# Patient Record
Sex: FEMALE | Race: WHITE
[De-identification: names, ages, dates, MRNs, and addresses within clinical notes are randomized per-mention and may not be internally consistent; named-entity substitution may affect disease eponyms.]

---

## 2017-02-21 ENCOUNTER — HOSPITAL ENCOUNTER (EMERGENCY)
Dept: HOSPITAL 62 - ER | Age: 77
LOS: 1 days | Discharge: HOME | End: 2017-02-22
Payer: MEDICARE

## 2017-02-21 DIAGNOSIS — S09.90XA: Primary | ICD-10-CM

## 2017-02-21 DIAGNOSIS — F03.90: ICD-10-CM
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
## 2017-02-21 DIAGNOSIS — R51: ICD-10-CM
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
## 2017-02-21 DIAGNOSIS — I10: ICD-10-CM

## 2017-02-21 PROCEDURE — 70450 CT HEAD/BRAIN W/O DYE: CPT

## 2017-02-21 PROCEDURE — 99284 EMERGENCY DEPT VISIT MOD MDM: CPT

## 2017-02-22 VITALS — SYSTOLIC BLOOD PRESSURE: 146 MMHG | DIASTOLIC BLOOD PRESSURE: 78 MMHG

## 2017-02-22 NOTE — ER DOCUMENT REPORT
ED Fall





- General


Chief Complaint: Fall


Stated Complaint: FALL/ALTERED MENTAL STATUS


Notes: 


The patient is a 76-year-old female, past medical history dementia, on 81 mg 

daily ASA, since from the nursing home after she was found to have her head 

against the table while at dinner.  She denies any LOC and is only complaining 

of a dull frontal headache.  She denies numbness, tingling, blurry vision, neck 

pain, chest pain, shortness of breath, fevers or abdominal pain.


TRAVEL OUTSIDE OF THE [place] IN LAST 30 DAYS: No





- Related data


Allergies/Adverse Reactions: 


 





No Known Allergies Allergy (Unverified 02/21/17 22:45)


 











Past Medical History





- General


Information source: Transfer Record


Cannot obtain history due to: Dementia





- Social History


Smoking Status: Unknown if Ever Smoked


Family History: Reviewed & Not Pertinent





- Past Medical History


Cardiac Medical History: Reports: Hx Hypercholesterolemia, Hx Hypertension


GI Medical History: Reports: Hx Gastroesophageal Reflux Disease


Past Surgical History: Reports: Hx Abdominal Surgery





Review of Systems





- Review of Systems


Notes: 


REVIEW OF SYSTEMS:


CONSTITUTIONAL: -fevers, -chills


EENT: -eye pain, -difficulty swallowing, -nasal congestion


CARDIOVASCULAR:-chest pain, -syncope.


RESPIRATORY: -cough, -SOB


GASTROINTESTINAL: -abdominal pain, -nausea, -vomiting, -diarrhea


GENITOURINARY: -dysuria, -hematuria


MUSCULOSKELETAL: -back pain, -neck pain


SKIN: -rash or skin lesions.


HEMATOLOGIC: -easy bruising or bleeding.


LYMPHATIC: -swollen, enlarged glands.


NEUROLOGICAL: -altered mental status or loss of consciousness, +headache, -

neurologic symptoms


PSYCHIATRIC: -anxiety, -depression.


ALL OTHER SYSTEMS REVIEWED AND NEGATIVE.





Physical Exam





- Vital signs


Vitals: 


 











Temp Pulse Resp BP Pulse Ox


 


 97.5 F   68   16   142/66 H  99 


 


 02/21/17 22:40  02/21/17 22:40  02/21/17 22:40  02/21/17 22:40  02/21/17 22:40














- Notes


Notes: 


PHYSICAL EXAMINATION:





GENERAL: Well-appearing, well-nourished and in no acute distress.





HEAD: Atraumatic, normocephalic.





EYES: Pupils equal round and reactive to light, extraocular movements intact, 

sclera anicteric, conjunctiva are normal.





ENT: nares patent, oropharynx clear without exudates.  Moist mucous membranes.





NECK: Normal range of motion, supple without lymphadenopathy





LUNGS: Breath sounds clear to auscultation bilaterally and equal.  No wheezes 

rales or rhonchi.





HEART: Regular rate and rhythm without murmurs





ABDOMEN: Soft, nontender, normoactive bowel sounds.  No guarding, no rebound.  

No masses appreciated.





EXTREMITIES: Normal range of motion, no pitting or edema.  No cyanosis.





NEUROLOGICAL: Cranial nerves grossly intact.  Normal speech, normal gait.  

Normal sensory, motor, and reflex exams.





PSYCH: Normal mood, normal affect.





SKIN: Warm, Dry, normal turgor, no rashes or lesions noted.





Course





- Re-evaluation


Re-evalutation: 


Patient had a witnessed fall and she said that she hit the front of her head 

against the table.  CT head does not show any acute abnormalities.  Tylenol 

resolved her mild headache.  No syncopal episode and no LOC.  Will DC home back 

to the nursing home with follow-up at primary care physician.  Given strict 

return precautions.





- Vital Signs


Vital signs: 


 











Temp Pulse Resp BP Pulse Ox


 


 97.5 F   68   16   142/66 H  99 


 


 02/21/17 22:40  02/21/17 22:40  02/21/17 22:40  02/21/17 22:40  02/21/17 22:40














- Diagnostic Test


Radiology reviewed: Image reviewed, Reports reviewed


Radiology results interpreted by me: 


CT Head: NAD





Discharge





- Discharge


Clinical Impression: 


Head injury


Qualifiers:


 Encounter type: initial encounter Qualified Code(s): S09.90XA - Unspecified 

injury of head, initial encounter





Condition: Good


Disposition: HOME, SELF-CARE


Additional Instructions: 


A CAT scan of your head does not show any bleeds or skull fractures.





HEADACHE:





     The physician does not feel that the headache you are experiencing has a 

serious underlying cause.  Most headaches are due to emotional stress, with 

resultant muscle tension (tension headache). Occasionally, headaches are 

secondary to changes in the blood vessels of the scalp (vascular headache and 

migraine headache).  Sometimes, a headache is the first symptom of another 

developing illness, such as a viral infection.  You have no evidence of stroke, 

bleeding, meningitis, or other serious cause of your headache.


     The treatment of headaches varies with the severity and cause of the pain.

  Not all headaches need pain shots.  In fact, there is evidence that using 

narcotics for headaches may make them worse in the long run.  The physician 

will determine the therapy that's in your best interest.


     If you develop a fever, if the headache is different from any you've 

previously experienced, or if the headache progressively worsens, then call 

your physician at once or go to the emergency room.








FOLLOW-UP CARE:


If you have been referred to a physician for follow-up care, call the physician

s office for an appointment as you were instructed or within the next two days.

  If you experience worsening or a significant change in your symptoms, notify 

the physician immediately or return to the Emergency Department at any time for 

re-evaluation.

## 2017-04-17 ENCOUNTER — HOSPITAL ENCOUNTER (OUTPATIENT)
Dept: HOSPITAL 62 - RAD | Age: 77
End: 2017-04-17
Attending: SPECIALIST
Payer: MEDICARE

## 2017-04-17 DIAGNOSIS — S32.2XXA: Primary | ICD-10-CM
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
## 2017-04-17 PROCEDURE — 72220 X-RAY EXAM SACRUM TAILBONE: CPT

## 2017-07-30 ENCOUNTER — HOSPITAL ENCOUNTER (EMERGENCY)
Dept: HOSPITAL 62 - ER | Age: 77
Discharge: NURSING, REHAB OR LONG-TERM CARE | End: 2017-07-30
Payer: MEDICARE

## 2017-07-30 VITALS — SYSTOLIC BLOOD PRESSURE: 152 MMHG | DIASTOLIC BLOOD PRESSURE: 85 MMHG

## 2017-07-30 DIAGNOSIS — G30.9: ICD-10-CM

## 2017-07-30 DIAGNOSIS — R10.9: ICD-10-CM
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
## 2017-07-30 DIAGNOSIS — M79.652: ICD-10-CM

## 2017-07-30 DIAGNOSIS — T14.8: Primary | ICD-10-CM

## 2017-07-30 DIAGNOSIS — R51: ICD-10-CM

## 2017-07-30 DIAGNOSIS — M79.651: ICD-10-CM

## 2017-07-30 DIAGNOSIS — M54.2: ICD-10-CM

## 2017-07-30 DIAGNOSIS — Z79.899: ICD-10-CM

## 2017-07-30 DIAGNOSIS — E78.00: ICD-10-CM

## 2017-07-30 DIAGNOSIS — I10: ICD-10-CM

## 2017-07-30 DIAGNOSIS — F02.80: ICD-10-CM
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
## 2017-07-30 DIAGNOSIS — M54.9: ICD-10-CM

## 2017-07-30 PROCEDURE — 72110 X-RAY EXAM L-2 SPINE 4/>VWS: CPT

## 2017-07-30 PROCEDURE — 99285 EMERGENCY DEPT VISIT HI MDM: CPT

## 2017-07-30 PROCEDURE — 73552 X-RAY EXAM OF FEMUR 2/>: CPT

## 2017-07-30 PROCEDURE — 72125 CT NECK SPINE W/O DYE: CPT

## 2017-07-30 PROCEDURE — 70450 CT HEAD/BRAIN W/O DYE: CPT

## 2017-07-30 NOTE — RADIOLOGY REPORT (SQ)
EXAM DESCRIPTION:  FEMUR BILATERAL 2 VIEWS



COMPLETED DATE/TIME:  7/30/2017 4:56 am



REASON FOR STUDY:  fall pain head neck bilateral femur and abdomen



COMPARISON:  None.



NUMBER OF VIEWS:  8 views.



TECHNIQUE:  8radiographic images acquired of the right and left femur to include hip and knee in at l
east one projection.



LIMITATIONS:  None.



FINDINGS:  MINERALIZATION: Normal.

BONES: No acute fracture.  No worrisome bone lesions.

SOFT TISSUES: No obvious swelling or foreign body.

OTHER: Sutures of the lower pelvis.



IMPRESSION:  NEGATIVE STUDY OF THE RIGHT AND LEFT FEMURS. NO RADIOGRAPHIC EVIDENCE FOR ACUTE INJURY.



TECHNICAL DOCUMENTATION:  JOB ID:  [id number]

 2011 [employer]- All Rights Reserved

## 2017-07-30 NOTE — RADIOLOGY REPORT (SQ)
EXAM DESCRIPTION:  CT CERVICAL SPINE WITHOUT



COMPLETED DATE/TIME:  7/30/2017 4:34 am



REASON FOR STUDY:  fall pain head neck bilateral femur and abdomen



COMPARISON:  None.



TECHNIQUE:  Axial images acquired through the cervical spine without intravenous contrast.  Images re
viewed with lung, soft tissue and bone windows.  Reconstructed coronal and sagittal MPR images review
ed.  Images stored on PACS.

All CT scanners at this facility use dose modulation, iterative reconstruction, and/or weight based d
osing when appropriate to reduce radiation dose to as low as reasonably achievable (ALARA).

[facility]: Dose Right  [facility]: CareDose    [facility]: Dose Right    [facility]: Teradose 4D    [facility]: Smart Technologies



RADIATION DOSE:  Up-to-date CT equipment and radiation dose reduction techniques were employed. CTDIv
ol: 7.6 mGy. DLP: 162 mGy-cm. mGy.



LIMITATIONS:  None.



FINDINGS:  ALIGNMENT: Anatomic.

MINERALIZATION: Normal.

VERTEBRAL BODIES: No fractures or dislocation.

DISCS: Small to moderate desiccated -protrusive disc disease between the C2 and C6 levels.  Mild-to-m
oderate bilateral see the 5 and C6 bony foraminal stenosis

FACETS, LATERAL MASSES, POSTERIOR ELEMENTS: Moderate spondylosis.

HARDWARE: None in the spine.

VISUALIZED RIBS: No fractures.

LUNG APICES AND SOFT TISSUES: Moderate bullous disease of the visualized upper lungs.

OTHER: No other significant finding.



IMPRESSION:  No acute findings.  In small to moderate desiccated-protrusive disc disease of the midce
rvical spine.  Moderate spondylosis.



TECHNICAL DOCUMENTATION:  JOB ID:  [id number]

Quality ID # 436: Final reports with documentation of one or more dose reduction techniques (e.g., Au
tomated exposure control, adjustment of the mA and/or kV according to patient size, use of iterative 
reconstruction technique)

 2011 [employer]- All Rights Reserved

## 2017-07-30 NOTE — RADIOLOGY REPORT (SQ)
EXAM DESCRIPTION:  CT HEAD WITHOUT



COMPLETED DATE/TIME:  7/30/2017 4:34 am



REASON FOR STUDY:  fall pain head neck bilateral femur and abdomen



COMPARISON:  2.22.

17



TECHNIQUE:  Axial images acquired through the brain without intravenous contrast.  Images reviewed wi
th bone, brain and subdural windows.  Images stored on PACS.

All CT scanners at this facility use dose modulation, iterative reconstruction, and/or weight based d
osing when appropriate to reduce radiation dose to as low as reasonably achievable (ALARA).

[facility]: Dose Right  [facility]: CareDose    [facility]: Dose Right    [facility]: Teradose 4D    [facility]: [employer]



RADIATION DOSE:  Up-to-date CT equipment and radiation dose reduction techniques were employed. CTDIv
ol: 50.5 mGy. DLP: 909 mGy-cm. mGy.



LIMITATIONS:  None.



FINDINGS:  VENTRICLES: Normal size and contour.

CEREBRUM: No masses.  No hemorrhage.  No midline shift.  Normal gray/white matter differentiation.  N
o evidence for acute infarction.  Mild-to-moderate cerebral volume loss.

CEREBELLUM: No masses.  No hemorrhage.  No alteration of density.  No evidence for acute infarction.

EXTRAAXIAL SPACES: No fluid collections.  No masses.

ORBITS AND GLOBE: No intra- or extraconal masses.  Normal contour of globe without masses.

CALVARIUM: No fracture.

PARANASAL SINUSES: No fluid or mucosal thickening.

SOFT TISSUES: No mass or hematoma.

OTHER: No other significant finding.



IMPRESSION:  No acute findings.



TECHNICAL DOCUMENTATION:  JOB ID:  [id number]

Quality ID # 436: Final reports with documentation of one or more dose reduction techniques (e.g., Au
tomated exposure control, adjustment of the mA and/or kV according to patient size, use of iterative 
reconstruction technique)

 2011 [email or web address]- All Rights Reserved

## 2017-07-30 NOTE — RADIOLOGY REPORT (SQ)
EXAM DESCRIPTION:  L SPINE WHOLE



COMPLETED DATE/TIME:  7/30/2017 4:56 am



REASON FOR STUDY:  fall pain head neck bilateral femur and abdomen



COMPARISON:  None.



NUMBER OF VIEWS:  Five views including obliques.



TECHNIQUE:  AP, lateral, oblique, and sacral radiographic images acquired of the lumbar spine.



LIMITATIONS:  None.



FINDINGS:  MINERALIZATION: Osteopenia.

SEGMENTATION: Normal.  No transitional anatomy.

ALIGNMENT: Normal.

VERTEBRAE: Mild L1 anterior compression deformity likely chronic compared with frontal abdomen radiog
raphs, 12/20/2016.

DISCS: Preserved height.  No significant osteophytes or end plate irregularity.

POSTERIOR ELEMENTS: Pedicles and facets are intact.  No pars defect or posterior arch defects.

HARDWARE: None in the spine.

PARASPINAL SOFT TISSUES: Normal.

PELVIS: Intact as visualized. No fractures or worrisome bone lesions. SI joints intact.

OTHER: No other significant finding.



IMPRESSION:  Mild L1 anterior compression deformity, likely chronic based on limited comparative exam
s.



TECHNICAL DOCUMENTATION:  JOB ID:  [id number]

 2011 [employer]- All Rights Reserved
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
## 2017-11-13 ENCOUNTER — HOSPITAL ENCOUNTER (INPATIENT)
Dept: HOSPITAL 62 - ER | Age: 77
LOS: 5 days | Discharge: NURSING, REHAB OR LONG-TERM CARE | DRG: 439 | End: 2017-11-18
Attending: INTERNAL MEDICINE | Admitting: INTERNAL MEDICINE
Payer: MEDICARE

## 2017-11-13 DIAGNOSIS — N17.9: ICD-10-CM

## 2017-11-13 DIAGNOSIS — E86.0: ICD-10-CM

## 2017-11-13 DIAGNOSIS — E78.5: ICD-10-CM

## 2017-11-13 DIAGNOSIS — N30.00: ICD-10-CM

## 2017-11-13 DIAGNOSIS — G30.9: ICD-10-CM

## 2017-11-13 DIAGNOSIS — I12.9: ICD-10-CM

## 2017-11-13 DIAGNOSIS — Z79.899: ICD-10-CM

## 2017-11-13 DIAGNOSIS — F02.81: ICD-10-CM

## 2017-11-13 DIAGNOSIS — K59.00: ICD-10-CM

## 2017-11-13 DIAGNOSIS — I71.4: ICD-10-CM

## 2017-11-13 DIAGNOSIS — K02.9: ICD-10-CM

## 2017-11-13 DIAGNOSIS — E87.0: ICD-10-CM

## 2017-11-13 DIAGNOSIS — N18.2: ICD-10-CM

## 2017-11-13 DIAGNOSIS — K85.10: Primary | ICD-10-CM

## 2017-11-13 DIAGNOSIS — Z66: ICD-10-CM

## 2017-11-13 DIAGNOSIS — I10: ICD-10-CM

## 2017-11-13 DIAGNOSIS — K85.90: ICD-10-CM

## 2017-11-13 DIAGNOSIS — M19.90: ICD-10-CM

## 2017-11-13 DIAGNOSIS — K21.9: ICD-10-CM

## 2017-11-13 DIAGNOSIS — E44.0: ICD-10-CM

## 2017-11-13 LAB
ALBUMIN SERPL-MCNC: 4.1 G/DL (ref 3.5–5)
ALP SERPL-CCNC: 138 U/L (ref 38–126)
ALT SERPL-CCNC: 11 U/L (ref 9–52)
ANION GAP SERPL CALC-SCNC: 11 MMOL/L (ref 5–19)
APPEARANCE UR: (no result)
AST SERPL-CCNC: 24 U/L (ref 14–36)
BASOPHILS # BLD AUTO: 0.1 10^3/UL (ref 0–0.2)
BASOPHILS NFR BLD AUTO: 1 % (ref 0–2)
BILIRUB DIRECT SERPL-MCNC: 0.5 MG/DL (ref 0–0.4)
BILIRUB SERPL-MCNC: 0.7 MG/DL (ref 0.2–1.3)
BILIRUB UR QL STRIP: NEGATIVE
BUN SERPL-MCNC: 25 MG/DL (ref 7–20)
CALCIUM: 9.2 MG/DL (ref 8.4–10.2)
CHLORIDE SERPL-SCNC: 107 MMOL/L (ref 98–107)
CO2 SERPL-SCNC: 27 MMOL/L (ref 22–30)
CREAT SERPL-MCNC: 1.36 MG/DL (ref 0.52–1.25)
EOSINOPHIL # BLD AUTO: 0.3 10^3/UL (ref 0–0.6)
EOSINOPHIL NFR BLD AUTO: 4 % (ref 0–6)
ERYTHROCYTE [DISTWIDTH] IN BLOOD BY AUTOMATED COUNT: 13.9 % (ref 11.5–14)
GLUCOSE SERPL-MCNC: 94 MG/DL (ref 75–110)
GLUCOSE UR STRIP-MCNC: NEGATIVE MG/DL
HCT VFR BLD CALC: 36.5 % (ref 36–47)
HGB BLD-MCNC: 12.3 G/DL (ref 12–15.5)
HGB HCT DIFFERENCE: 0.4
KETONES UR STRIP-MCNC: NEGATIVE MG/DL
LIPASE SERPL-CCNC: 447.9 U/L (ref 23–300)
LYMPHOCYTES # BLD AUTO: 1.7 10^3/UL (ref 0.5–4.7)
LYMPHOCYTES NFR BLD AUTO: 21.9 % (ref 13–45)
MCH RBC QN AUTO: 31.2 PG (ref 27–33.4)
MCHC RBC AUTO-ENTMCNC: 33.7 G/DL (ref 32–36)
MCV RBC AUTO: 93 FL (ref 80–97)
MONOCYTES # BLD AUTO: 0.7 10^3/UL (ref 0.1–1.4)
MONOCYTES NFR BLD AUTO: 9.6 % (ref 3–13)
NEUTROPHILS # BLD AUTO: 4.9 10^3/UL (ref 1.7–8.2)
NEUTS SEG NFR BLD AUTO: 63.5 % (ref 42–78)
NITRITE UR QL STRIP: NEGATIVE
PH UR STRIP: 5 [PH] (ref 5–9)
POTASSIUM SERPL-SCNC: 4.5 MMOL/L (ref 3.6–5)
PROT SERPL-MCNC: 8.2 G/DL (ref 6.3–8.2)
PROT UR STRIP-MCNC: NEGATIVE MG/DL
RBC # BLD AUTO: 3.94 10^6/UL (ref 3.72–5.28)
SODIUM SERPL-SCNC: 145.2 MMOL/L (ref 137–145)
SP GR UR STRIP: 1.01
UROBILINOGEN UR-MCNC: NEGATIVE MG/DL (ref ?–2)
WBC # BLD AUTO: 7.8 10^3/UL (ref 4–10.5)

## 2017-11-13 PROCEDURE — 99285 EMERGENCY DEPT VISIT HI MDM: CPT

## 2017-11-13 PROCEDURE — 84100 ASSAY OF PHOSPHORUS: CPT

## 2017-11-13 PROCEDURE — 96361 HYDRATE IV INFUSION ADD-ON: CPT

## 2017-11-13 PROCEDURE — 87086 URINE CULTURE/COLONY COUNT: CPT

## 2017-11-13 PROCEDURE — 87040 BLOOD CULTURE FOR BACTERIA: CPT

## 2017-11-13 PROCEDURE — 96372 THER/PROPH/DIAG INJ SC/IM: CPT

## 2017-11-13 PROCEDURE — 36415 COLL VENOUS BLD VENIPUNCTURE: CPT

## 2017-11-13 PROCEDURE — 83690 ASSAY OF LIPASE: CPT

## 2017-11-13 PROCEDURE — 80048 BASIC METABOLIC PNL TOTAL CA: CPT

## 2017-11-13 PROCEDURE — 74000: CPT

## 2017-11-13 PROCEDURE — 85027 COMPLETE CBC AUTOMATED: CPT

## 2017-11-13 PROCEDURE — 85025 COMPLETE CBC W/AUTO DIFF WBC: CPT

## 2017-11-13 PROCEDURE — 83735 ASSAY OF MAGNESIUM: CPT

## 2017-11-13 PROCEDURE — G0378 HOSPITAL OBSERVATION PER HR: HCPCS

## 2017-11-13 PROCEDURE — 80061 LIPID PANEL: CPT

## 2017-11-13 PROCEDURE — 96365 THER/PROPH/DIAG IV INF INIT: CPT

## 2017-11-13 PROCEDURE — 80053 COMPREHEN METABOLIC PANEL: CPT

## 2017-11-13 PROCEDURE — S0183 PROCHLORPERAZINE 5 MG: HCPCS

## 2017-11-13 PROCEDURE — 81001 URINALYSIS AUTO W/SCOPE: CPT

## 2017-11-13 PROCEDURE — 74177 CT ABD & PELVIS W/CONTRAST: CPT

## 2017-11-13 RX ADMIN — Medication SCH ML: at 22:34

## 2017-11-13 RX ADMIN — ATORVASTATIN CALCIUM SCH MG: 10 TABLET, FILM COATED ORAL at 22:34

## 2017-11-13 RX ADMIN — SODIUM CHLORIDE PRN ML: 9 INJECTION, SOLUTION INTRAVENOUS at 15:20

## 2017-11-13 RX ADMIN — SODIUM CHLORIDE PRN ML: 9 INJECTION, SOLUTION INTRAVENOUS at 16:10

## 2017-11-13 RX ADMIN — DORIPENEM SCH MG: 500 POWDER, FOR SOLUTION INTRAVENOUS at 22:31

## 2017-11-13 RX ADMIN — QUETIAPINE FUMARATE SCH MG: 25 TABLET, FILM COATED ORAL at 22:34

## 2017-11-13 NOTE — ER DOCUMENT REPORT
ED General





- General


Chief Complaint: Abdominal Pain


Stated Complaint: ABDOMINAL PAIN


Time Seen by Provider: 11/13/17 12:32


Mode of Arrival: Medic


Information source: Patient


Cannot obtain history due to: Dementia


Notes: 





77 yr old female hx of dementia presents from the [facility] with concern of abd pain 

and nausea vomtiing prior to arrival. Upon arrival pt denies any concerns at 

all and requests to go home immediately without any work up.  No care giver is 

present. 


TRAVEL OUTSIDE OF THE [place] IN LAST 30 DAYS: No





- HPI


Onset: Just prior to arrival


Onset/Duration: Sudden


Quality of pain: Achy


Severity: Mild


Pain Level: 1


Associated symptoms: Nausea, Vomiting


Exacerbated by: Denies


Relieved by: Denies


Similar symptoms previously: No


Recently seen / treated by doctor: No





- Related Data


Allergies/Adverse Reactions: 


 





No Known Allergies Allergy (Unverified 02/21/17 22:45)


 








Home Medications: 


 Current Home Medications





Atorvastatin Calcium [Lipitor 10 mg Tablet] 10 mg PO QHS 11/13/17 [History]


Metoprolol Succinate [Toprol Xl 25 mg Tab.sr] 12.5 mg PO DAILY 11/13/17 [History

]


Omeprazole 40 mg PO Q6AM 11/13/17 [History]


Quetiapine Fumarate [Seroquel] 50 mg PO QHS 11/13/17 [History]











Past Medical History





- Social History


Smoking Status: Never Smoker


Cigarette use (# per day): No


Chew tobacco use (# tins/day): No


Smoking Education Provided: No


Family History: Reviewed & Not Pertinent





- Past Medical History


Cardiac Medical History: Reports: Hx Hypercholesterolemia, Hx Hypertension


Renal/ Medical History: Denies: Hx Peritoneal Dialysis


GI Medical History: Reports: Hx Gastroesophageal Reflux Disease


Musculoskeltal Medical History: Reports Hx Arthritis


Psychiatric Medical History: Reports: Hx Dementia - Alzheimer's


Past Surgical History: Reports: Hx Abdominal Surgery





Review of Systems





- Review of Systems


Notes: 





REVIEW OF SYSTEMS:


CONSTITUTIONAL :  Denies fever,  chills, or sweats.  Denies recent illness.


EENT:   Denies eye, ear, throat, or mouth pain or symptoms.  Denies nasal or 

sinus congestion or discharge.  Denies throat, tongue, or mouth swelling or 

difficulty swallowing.


CARDIOVASCULAR:  Denies chest pain.  Denies palpitations or racing or irregular 

heart beat.  Denies ankle edema.


RESPIRATORY:  Denies cough, cold, or chest congestion.  Denies shortness of 

breath, difficulty breathing, or wheezing.


GASTROINTESTINAL: Admits to abdominal pain nausea vomiting


GENITOURINARY:  Denies difficulty urinating, painful urination, burning, 

frequency, blood in urine, or discharge.


FEMALE  GENITOURINARY:  Denies vaginal bleeding, heavy or abnormal periods, 

irregular periods.  Denies vaginal discharge or odor. 


MUSCULOSKELETAL:  Denies back or neck pain or stiffness.  Denies joint pain or 

swelling.


SKIN:   Denies rash, lesions or sores.


HEMATOLOGIC :   Denies easy bruising or bleeding.


LYMPHATIC:  Denies swollen, enlarged glands.


NEUROLOGICAL:  Denies confusion or altered mental status.  Denies passing out 

or loss of consciousness.  Denies dizziness or lightheadedness.  Denies 

headache.  Denies weakness or paralysis or loss of use of either side.  Denies 

problems with gait or speech.  Denies sensory loss, numbness, or tingling.  

Denies seizures.


PSYCHIATRIC:  Denies anxiety or stress.  Denies depression, suicidal ideation, 

or homicidal ideation.





ALL OTHER SYSTEMS REVIEWED AND NEGATIVE.











PHYSICAL EXAMINATION:





GENERAL: Well-appearing, well-nourished and in no acute distress.





HEAD: Atraumatic, normocephalic.





EYES: Pupils equal round and reactive to light, extraocular movements intact, 

conjunctiva are normal.





ENT: Nares patent, oropharynx clear without exudates.  Moist mucous membranes.





NECK: Normal range of motion, supple without lymphadenopathy





LUNGS: Breath sounds clear to auscultation bilaterally and equal.  No wheezes 

rales or rhonchi.





HEART: Regular rate and rhythm without murmurs





ABDOMEN: Soft, minimally tender epigastric region no rebound or guarding





Female : deferred





Musculoskeletal: Normal range of motion, no pitting or edema.  No cyanosis.





NEUROLOGICAL: Cranial nerves grossly intact.  Normal speech, normal gait.  

Normal sensory, motor exams





PSYCH: Normal mood, normal affect.





SKIN: Warm, Dry, normal turgor, no rashes or lesions noted.

















Dictation was performed using Dragon voice recognition software





Physical Exam





- Vital signs


Vitals: 


 











Resp Pulse Ox


 


 17   92 


 


 11/13/17 11:46  11/13/17 11:46














Course





- Re-evaluation


Re-evalutation: 





11/13/17 13:05


When I evaluated the patient, she was in no distress denies any complaints at 

all but was quite demented, she is oriented to person place not time.  She 

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  She was agreeable at 

that time to do a CT of the abdomen even though she is nontender in no 

distress.  Patient was evaluated by the nurse right after me immediately 

refused to have IV placed stated that she does not have any imaging done and 

that we cannot force her.  I did attempt to call daughter and left a voice 

message


11/13/17 15:53


Daughter called again after finding of aortic aneurysm , 


[facility] was paged as well in order to get further history since patient has 

dementia 


She was 


11/13/17 16:04


I did speak regarding admission with Dr. Downs, I explained that the patient 

denies any abdominal pain at this time, lipase was elevated patient does have 

urinary tract infection, 6 cm aortic aneurysm is noted but patient is a DNR, he 

believes patient's appropriate for discharge however at this time I will 

continue to watch in the ED


11/13/17 16:27


Ms Simmons called for the 4th time 


11/13/17 16:38


Dr Malone vascular surgeon states he would not intervene, requests beta blocker 

for pressure control increased to 25 mg daily 


11/13/17 16:39


I spoke with Dr Downs and Dr Munroe, since patient is a DNR, no surgical 

intervention is offered by [facility], I would like ot admit her here for the 

pancreatitis and UTI. Pt is now admitting to mild epigastric pain which does 

not appear to be related to the AAA, i would have preferred to speak with 

family but again no response 








- Vital Signs


Vital signs: 


 











Temp Pulse Resp BP Pulse Ox


 


       29 H  138/80 H  100 


 


       11/13/17 13:01  11/13/17 16:02  11/13/17 16:02














- Laboratory


Result Diagrams: 


 11/13/17 14:43





 11/13/17 12:34


Laboratory results interpreted by me: 


 











  11/13/17 11/13/17





  12:34 12:34


 


Sodium  145.2 H 


 


BUN  25 H 


 


Creatinine  1.36 H 


 


Est GFR ( Amer)  46 L 


 


Est GFR (Non-Af Amer)  38 L 


 


Direct Bilirubin  0.5 H 


 


Alkaline Phosphatase  138 H 


 


Lipase  447.9 H 


 


Urine Blood   SMALL H


 


Ur Leukocyte Esterase   LARGE H














- Diagnostic Test


Radiology reviewed: Image reviewed, Reports reviewed





Discharge





- Discharge


Clinical Impression: 


 DNR (do not resuscitate), AAA (abdominal aortic aneurysm) without rupture





UTI (urinary tract infection)


Qualifiers:


 Urinary tract infection type: acute cystitis Hematuria presence: without 

hematuria Qualified Code(s): N30.00 - Acute cystitis without hematuria





Pancreatitis


Qualifiers:


 Chronicity: acute Pancreatitis type: unspecified pancreatitis type Acute 

pancreatitis complication: unspecified Qualified Code(s): K85.90 - Acute 

pancreatitis without necrosis or infection, unspecified





Dementia


Qualifiers:


 Dementia type: unspecified type Dementia behavioral disturbance: with 

behavioral disturbance Qualified Code(s): F03.91 - Unspecified dementia with 

behavioral disturbance





Condition: Stable


Disposition: ADMITTED AS OBSERVATION


Admitting Provider: Hospitalist


Unit Admitted: Telemetry

## 2017-11-13 NOTE — PROGRESS NOTE
Provider Note


Provider Note: 


The RN admitting the patient reports that the patient has confirmation of DNR 

status.  Her paperwork has been DNR since December 2, 2016.  Therefore, I will 

change the patient's CODE STATUS to DO NOT RESUSCITATE.

## 2017-11-13 NOTE — RADIOLOGY REPORT (SQ)
EXAM DESCRIPTION:  CT ABD/PELVIS WITH IV ONLY



COMPLETED DATE/TIME:  11/13/2017 5:03 pm



REASON FOR STUDY:  abd pain



COMPARISON:  None.



TECHNIQUE:  CT scan of the abdomen and pelvis performed using helical scanning technique with dynamic
 intravenous contrast injection.  No oral contrast. Images reviewed with lung, soft tissue, and bone 
windows. Reconstructed coronal and sagittal MPR images reviewed. Delayed images for evaluation of the
 urinary system also acquired. All images stored on PACS.

All CT scanners at this facility use dose modulation, iterative reconstruction, and/or weight based d
osing when appropriate to reduce radiation dose to as low as reasonably achievable (ALARA).

[facility]: Dose Right  [facility]: CareDose    [facility]: Dose Right    [facility]: Teradose 4D    [facility]: Smart Technologies



CONTRAST TYPE AND DOSE:  contrast/concentration: Isovue 370.00 mg/ml; Total Contrast Delivered: 53.0 
ml; Total Saline Delivered: 39.4 ml



RENAL FUNCTION:  BUN 25 creatinine 1.4



RADIATION DOSE:  Up-to-date CT equipment and radiation dose reduction techniques were employed. CTDIv
ol: 5.4 - 6.5 mGy. DLP: 625 mGy-cm..



LIMITATIONS:  Positioning.



FINDINGS:  LOWER CHEST: Hiatal hernia.

LIVER: Normal size. No masses.  No dilated ducts.

SPLEEN: Normal size. No focal lesions.

PANCREAS: No masses. No significant calcifications. No adjacent inflammation or peripancreatic fluid 
collections. Pancreatic duct not dilated.

GALLBLADDER: Gallstones. No inflammatory changes to suggest cholecystitis.

ADRENAL GLANDS: No significant masses or asymmetry.

RIGHT KIDNEY AND URETER: No solid masses.   No significant calcifications.   No hydronephrosis or hyd
roureter.

LEFT KIDNEY AND URETER: No solid masses.   No significant calcifications.   No hydronephrosis or hydr
oureter.

AORTA AND VESSELS: Infrarenal aortic aneurysm 5.5 x 5.8 x 5.7 cm in AP by transverse by craniocaudal 
diameter.  The true lumen diameter 3.0 x 4.4 cm.  Mural thrombus to right of midline but no definite 
active extravasation.

RETROPERITONEUM: No retroperitoneal adenopathy, hemorrhage or masses.

BOWEL AND PERITONEAL CAVITY: Abundant fecal material throughout nondilated colon.

APPENDIX: Not visualized.

PELVIS: Mild mass effect on the urinary bladder due to impacted fecal material in the rectum.

ABDOMINAL WALL: Prior ventral hernia repair.

BONES: No significant or acute findings.

OTHER: No other significant finding.



IMPRESSION:

1. 6 cm infrarenal aortic aneurysm.

2. Fecal retention.

3. Cholelithiasis.



TECHNICAL DOCUMENTATION:  JOB ID:  [id number]

Quality ID # [id number]: Final reports with documentation of one or more dose reduction techniques (e.g., Au
tomated exposure control, adjustment of the mA and/or kV according to patient size, use of iterative 
reconstruction technique)

 2011 [employer]- All Rights Reserved

## 2017-11-13 NOTE — PDOC H&P
History of Present Illness


Admission Date/PCP: 


  11/13/17 16:56





  NO LOCALMD





Patient complains of: Abdominal pain.


History of Present Illness: 


YOKO ACEVEDO is a 77 year old female with underlying dementia.  She normally 

resides at the [facility].  She was brought in today for a 3 day history of abdominal 

pain.  I am unable to get any reliable history from this patient secondary to 

her severe dementia.  However, intermittently she has complained of nausea and 

vomiting with a poor appetite.  She denies fevers or diarrhea.  Again, her 

review of systems is very unreliable.  The patient's physical exam is 

compatible with acute pancreatitis.  The patient did have a CT scan done that 

confirms gallstones but does not demonstrate evidence of acute cholecystitis.  

The patient also has evidence of a urinary tract infection and according to 

records at [facility] she has had an extended spectrum beta lactamase 

producing bacteria in the past.  She will be admitted to observation and 

reevaluated in the morning.  She will be made n.p.o. overnight and we can try 

to advance her diet tomorrow.








Past Medical History


Cardiac Medical History: Reports: Hyperlipidema, Hypertension


GI Medical History: Reports: Gastroesophageal Reflux Disease


Musculoskeltal Medical History: Reports: Arthritis


Psychiatric Medical History: Reports: Dementia - Alzheimer's





Social History


Smoking Status: Never Smoker





- Advance Directive


Resuscitation Status: Full Code


Surrogate healthcare decision maker:: 


The computer lists Carissa Simmons as the patient's point of contact.  Her phone 

number in the chart is [phone]








Family History


Family History: Reviewed & Not Pertinent


Parental Family History Reviewed: No - Unknown


Children Family History Reviewed: Unknown


Sibling(s) Family History Reviewed.: Unknown





Medication/Allergy


Home Medications: 








Atorvastatin Calcium [Lipitor 10 mg Tablet] 10 mg PO QHS 11/13/17 


Metoprolol Succinate [Toprol Xl 25 mg Tab.sr] 12.5 mg PO DAILY 11/13/17 


Omeprazole 40 mg PO Q6AM 11/13/17 


Quetiapine Fumarate [Seroquel] 50 mg PO QHS 11/13/17 








Allergies/Adverse Reactions: 


 





No Known Allergies Allergy (Unverified 02/21/17 22:45)


 











Review of Systems


ROS unobtainable: Due to mental status





Physical Exam


Vital Signs: 


 











Temp Pulse Resp BP Pulse Ox


 


       29 H  138/80 H  100 


 


       11/13/17 13:01  11/13/17 16:02  11/13/17 16:02











General appearance: PRESENT: no acute distress


Additional comments: 


The patient is in no distress.  She is oriented to person.  She does not know 

that she is in the emergency room.  She cannot tell me what city she is in.  

She cannot tell me her current address.  She is able to follow simple commands, 

E.  G.  Take a deep breath.  Her facial appearance is fairly normal.  Cranial 

nerves II through XII are intact.  Her oropharynx shows that she has upper 

teeth but no lower teeth.  She does have moderate dental caries present.  The 

lips are normal.  The mucous membranes are moist.  The neck is supple.  She has 

full range of motion.  Trachea is midline.  Thyroid is nonpalpable.  She does 

not have any cervical or supraclavicular lymphadenopathy.  The lungs are clear 

anteriorly.  Posteriorly, the patient has some scattered crackles at the bases 

of the lung fields.  Her cardiac exam is regular without murmurs, gallops or 

rubs.  The abdomen is soft and flat.  Bowel sounds are present and are 

hypoactive.  The patient does not have any hepatosplenomegaly.  She has no 

guarding or rebound noted and there are no hernias or masses present.  The 

patient does have epigastric pain with moderate to deep palpation.  The lower 

extremities are warm to touch.  She has trace pedal edema which is symmetrical 

bilaterally.  Skin exam is clean dry and intact without lesions or rashes.








Results


Impressions: 


 





Abdomen/Pelvis CT  11/13/17 12:32


IMPRESSION:


1. 6 cm infrarenal aortic aneurysm.


2. Fecal retention.


3. Cholelithiasis.


 














Assessment & Plan





- Diagnosis


(1) AAA (abdominal aortic aneurysm) without rupture


Is this a current diagnosis for this admission?: Yes   


Plan: 


The emergency room physician has contacted the vascular surgeon at [facility].  They have not recommended any intervention at this 

time for the abdominal aortic aneurysm.  However, recommendations include 

increasing the dose of beta-blocker.








(2) Dementia


Qualifiers: 


   Dementia type: unspecified type   Dementia behavioral disturbance: with 

behavioral disturbance   Qualified Code(s): F03.91 - Unspecified dementia with 

behavioral disturbance   


Is this a current diagnosis for this admission?: Yes   


Plan: 


At this point in time I need to corroborate old records for CODE STATUS.  We 

are trying to reach Carissa Simmons.  Until I can verify this information I will 

make the patient a full code.








(3) Pancreatitis


Qualifiers: 


   Chronicity: acute   Pancreatitis type: unspecified pancreatitis type   Acute 

pancreatitis complication: unspecified   Qualified Code(s): K85.90 - Acute 

pancreatitis without necrosis or infection, unspecified   


Plan: 


Begin IV fluids.  I will make the patient n.p.o. overnight.  Hopefully, we can 

try to advance her diet tomorrow.  The patient does have gallstones.  If she is 

considered an appropriate candidate we could consider an elective 

cholecystectomy.








(4) UTI (urinary tract infection)


Qualifiers: 


   Urinary tract infection type: acute cystitis   Hematuria presence: without 

hematuria   Qualified Code(s): N30.00 - Acute cystitis without hematuria   


Is this a current diagnosis for this admission?: Yes   


Plan: 


Antibiotics will be started for acute urinary tract infection.








- Time


Time Spent: 50 to 70 Minutes





- Inpatient Certification


Medical Necessity: Significant Comorbidiites Make Outpatient Treatment Too Risky

, Need for Pain Control, Need for IV Antibiotics, Risk of Complication if Not 

Cared For in Hospital

## 2017-11-14 LAB
ANION GAP SERPL CALC-SCNC: 11 MMOL/L (ref 5–19)
BUN SERPL-MCNC: 17 MG/DL (ref 7–20)
CALCIUM: 8.8 MG/DL (ref 8.4–10.2)
CHLORIDE SERPL-SCNC: 109 MMOL/L (ref 98–107)
CHOLEST SERPL-MCNC: 200.84 MG/DL (ref 0–200)
CO2 SERPL-SCNC: 24 MMOL/L (ref 22–30)
CREAT SERPL-MCNC: 1.15 MG/DL (ref 0.52–1.25)
DIRECT HDL: 38 MG/DL (ref 40–?)
ERYTHROCYTE [DISTWIDTH] IN BLOOD BY AUTOMATED COUNT: 14.3 % (ref 11.5–14)
GLUCOSE SERPL-MCNC: 97 MG/DL (ref 75–110)
HCT VFR BLD CALC: 34.5 % (ref 36–47)
HGB BLD-MCNC: 11.7 G/DL (ref 12–15.5)
HGB HCT DIFFERENCE: 0.6
LDLC SERPL DIRECT ASSAY-MCNC: 130 MG/DL (ref ?–100)
MAGNESIUM SERPL-MCNC: 2 MG/DL (ref 1.6–2.3)
MCH RBC QN AUTO: 31.3 PG (ref 27–33.4)
MCHC RBC AUTO-ENTMCNC: 34.1 G/DL (ref 32–36)
MCV RBC AUTO: 92 FL (ref 80–97)
PHOSPHATE SERPL-MCNC: 3.3 MG/DL (ref 2.5–4.5)
POTASSIUM SERPL-SCNC: 4 MMOL/L (ref 3.6–5)
RBC # BLD AUTO: 3.75 10^6/UL (ref 3.72–5.28)
SODIUM SERPL-SCNC: 144.2 MMOL/L (ref 137–145)
TRIGL SERPL-MCNC: 114 MG/DL (ref ?–150)
VLDLC SERPL CALC-MCNC: 23 MG/DL (ref 10–31)
WBC # BLD AUTO: 6.2 10^3/UL (ref 4–10.5)

## 2017-11-14 RX ADMIN — LANSOPRAZOLE SCH MG: 30 TABLET, ORALLY DISINTEGRATING, DELAYED RELEASE ORAL at 05:38

## 2017-11-14 RX ADMIN — ATORVASTATIN CALCIUM SCH MG: 10 TABLET, FILM COATED ORAL at 21:20

## 2017-11-14 RX ADMIN — QUETIAPINE FUMARATE SCH MG: 25 TABLET, FILM COATED ORAL at 21:20

## 2017-11-14 RX ADMIN — OXYCODONE AND ACETAMINOPHEN PRN TAB: 5; 325 TABLET ORAL at 09:23

## 2017-11-14 RX ADMIN — DEXTROSE AND SODIUM CHLORIDE PRN ML: 5; .45 INJECTION, SOLUTION INTRAVENOUS at 14:21

## 2017-11-14 RX ADMIN — METOPROLOL SUCCINATE SCH MG: 25 TABLET, EXTENDED RELEASE ORAL at 09:22

## 2017-11-14 RX ADMIN — DORIPENEM SCH MG: 500 POWDER, FOR SOLUTION INTRAVENOUS at 05:48

## 2017-11-14 RX ADMIN — Medication SCH ML: at 05:39

## 2017-11-14 RX ADMIN — Medication SCH ML: at 21:21

## 2017-11-14 RX ADMIN — OXYCODONE AND ACETAMINOPHEN PRN TAB: 5; 325 TABLET ORAL at 15:40

## 2017-11-14 RX ADMIN — DOCUSATE SODIUM SCH MG: 100 CAPSULE, LIQUID FILLED ORAL at 09:22

## 2017-11-14 RX ADMIN — Medication SCH ML: at 14:10

## 2017-11-14 NOTE — PHYSICIAN ADVISORY NOTE
Physician Advisor ProgressNote


.: 


Pursuant to the  plan for [facility], I have reviewed the medical record 

for this patient.  





Physician Advisor Statement: 





Please consider documenting, if you agree:


1.  "Hypernatremia, likely due to intravascular volume depletion, now resolved"


2.  If she shows evidence of increased work of breathing associated with 

hypoxemia, please document this & consider dx of "Acute Resp Failure"








Status:  Appropriately made Obs initially.


Since coming in, pt has had episodes of hypoxemia with tachypnea, as well as 

agitation.  She was started on Doripenem (after Rocephin in ED).  


If she cannot be advanced in diet and safely set up for discharge today, please 

document the reasons ("I am concerned about ....") and may then change to 

Inpatient status.








Thanks!


CK

## 2017-11-14 NOTE — PDOC PROGRESS REPORT
Subjective


Progress Note for:: 11/14/17


Subjective:: 





YOKO ACEVEDO is a 77 year old female with underlying dementia who was admitted 

on 11/13 with nausea, vomiting, and poor PO appetite. Admission labs notable 

for elevated lipase however CT abdomen showed no evidence of intra-abdominal 

pathology other than choleithiasis. She was admitted for suspected pancreatitis 

as observation status.





Overnight continues to complain of abdominal pain, nausea, and vomiting. States 

that she had 3 episodes of non-bloody emesis. Has been unable to tolerate any 

PO due to symptoms.  Denies fevers, chills, CP, SOB, abdominal pain. Patient at 

times confused and is an unreliable historian. Denies previous episodes of 

pancreatitis. 





Physical Exam


Vital Signs: 


 











Temp Pulse Resp BP Pulse Ox


 


 97.8 F   76   18   143/90 H  94 


 


 11/14/17 11:23  11/14/17 11:23  11/14/17 11:23  11/14/17 11:23  11/14/17 11:23








 Intake & Output











 11/13/17 11/14/17 11/15/17





 06:59 06:59 06:59


 


Weight  51.6 kg 











General appearance: PRESENT: no acute distress, well-developed, well-nourished


Head exam: PRESENT: atraumatic, normocephalic


Eye exam: PRESENT: conjunctiva pink.  ABSENT: scleral icterus


Mouth exam: PRESENT: dry mucosa


Neck exam: ABSENT: carotid bruit, JVD, lymphadenopathy, thyromegaly


Respiratory exam: PRESENT: crackles - mild crackles at bases, decreased breath 

sounds.  ABSENT: rales, rhonchi, wheezes


Cardiovascular exam: PRESENT: RRR.  ABSENT: diastolic murmur, rubs, systolic 

murmur


Pulses: PRESENT: normal dorsalis pedis pul


Vascular exam: PRESENT: normal capillary refill


GI/Abdominal exam: PRESENT: normal bowel sounds, soft, tenderness - Epigastric 

tenderness to deep palpation.  ABSENT: distended, guarding, mass, organolmegaly

, rebound


Rectal exam: PRESENT: deferred


Extremities exam: PRESENT: full ROM.  ABSENT: calf tenderness, clubbing, pedal 

edema


Neurological exam: PRESENT: alert, awake, oriented to person, oriented to 

situation, CN II-XII grossly intact.  ABSENT: oriented to place, oriented to 

time, motor sensory deficit


Psychiatric exam: PRESENT: anxious.  ABSENT: homicidal ideation, suicidal 

ideation


Focused psych exam: PRESENT: other - Confused


Skin exam: PRESENT: dry, intact, warm.  ABSENT: cyanosis, rash





Results


Laboratory Results: 


 





 11/14/17 04:25 





 11/14/17 04:25 





 











  11/14/17 11/14/17





  04:25 04:25


 


WBC  6.2 


 


RBC  3.75 


 


Hgb  11.7 L 


 


Hct  34.5 L 


 


MCV  92 


 


MCH  31.3 


 


MCHC  34.1 


 


RDW  14.3 H 


 


Plt Count  205 


 


Sodium   144.2


 


Potassium   4.0


 


Chloride   109 H


 


Carbon Dioxide   24


 


Anion Gap   11


 


BUN   17


 


Creatinine   1.15


 


Est GFR ( Amer)   56 L


 


Est GFR (Non-Af Amer)   46 L


 


Glucose   97


 


Calcium   8.8


 


Phosphorus   3.3


 


Magnesium   2.0


 


Triglycerides   114


 


Cholesterol   200.84 H


 


LDL Cholesterol Direct   130 H


 


VLDL Cholesterol   23.0


 


HDL Cholesterol   38 L











Impressions: 


 





Abdomen/Pelvis CT  11/13/17 12:32


IMPRESSION:


1. 6 cm infrarenal aortic aneurysm.


2. Fecal retention.


3. Cholelithiasis.


 














Assessment & Plan





- Diagnosis


(1) Pancreatitis


Qualifiers: 


   Chronicity: acute   Pancreatitis type: unspecified pancreatitis type   Acute 

pancreatitis complication: unspecified   Qualified Code(s): K85.90 - Acute 

pancreatitis without necrosis or infection, unspecified   


Plan: 


Not entirely clear if abdominal pain is secondary to pancreatitis. Lipase is 

noted to be elevated however there is no evidence of pancreatic stranding on CT 

abdominal report. However patient continues to have abdominal pain, nausea, 

vomiting, and poor PO intake. 





Plan


- Continue NPO status for now, consideration of advancing diet later today or 

tomorrow AM


- Continue supportive measures with IVF, anti-emetics, and pain medications


- No indication for further imaging at this time.  








(2) UTI (urinary tract infection)


Qualifiers: 


   Urinary tract infection type: acute cystitis   Hematuria presence: without 

hematuria   Qualified Code(s): N30.00 - Acute cystitis without hematuria   


Is this a current diagnosis for this admission?: Yes   


Plan: 


Resolved. Urine culture with mixed marisol. Antibiotics discontinued. 








(3) AAA (abdominal aortic aneurysm) without rupture


Is this a current diagnosis for this admission?: Yes   


Plan: 


At admission, [facility] ED contacted the vascular surgeon at [facility].  They have not recommended any intervention at this time for 

the abdominal aortic aneurysm.  Beta-blocker dose was increased per 

recommendation. Should be followed closely as an outpatient. 








(4) DNR (do not resuscitate)


Plan: 


Code status discussed and is DNI/DNR








(5) Dementia


Qualifiers: 


   Dementia type: unspecified type   Dementia behavioral disturbance: with 

behavioral disturbance   Qualified Code(s): F03.91 - Unspecified dementia with 

behavioral disturbance   


Is this a current diagnosis for this admission?: Yes   


Plan: 


Appears to be at baseline. 








- Time


Time Spent with patient: 15-24 minutes


Critical Time spent with patient: 15-24 minutes





- Inpatient Certification


Medical Necessity: Need For IV Fluids, Risk of Complication if Not Cared For in 

Hospital

## 2017-11-15 LAB
ANION GAP SERPL CALC-SCNC: 13 MMOL/L (ref 5–19)
BUN SERPL-MCNC: 14 MG/DL (ref 7–20)
CALCIUM: 8.7 MG/DL (ref 8.4–10.2)
CHLORIDE SERPL-SCNC: 108 MMOL/L (ref 98–107)
CO2 SERPL-SCNC: 22 MMOL/L (ref 22–30)
CREAT SERPL-MCNC: 1.08 MG/DL (ref 0.52–1.25)
ERYTHROCYTE [DISTWIDTH] IN BLOOD BY AUTOMATED COUNT: 14.1 % (ref 11.5–14)
GLUCOSE SERPL-MCNC: 112 MG/DL (ref 75–110)
HCT VFR BLD CALC: 32.1 % (ref 36–47)
HGB BLD-MCNC: 11.1 G/DL (ref 12–15.5)
HGB HCT DIFFERENCE: 1.2
MCH RBC QN AUTO: 31.7 PG (ref 27–33.4)
MCHC RBC AUTO-ENTMCNC: 34.6 G/DL (ref 32–36)
MCV RBC AUTO: 92 FL (ref 80–97)
POTASSIUM SERPL-SCNC: 3.7 MMOL/L (ref 3.6–5)
RBC # BLD AUTO: 3.5 10^6/UL (ref 3.72–5.28)
SODIUM SERPL-SCNC: 143 MMOL/L (ref 137–145)
WBC # BLD AUTO: 6 10^3/UL (ref 4–10.5)

## 2017-11-15 RX ADMIN — Medication SCH ML: at 21:25

## 2017-11-15 RX ADMIN — LANSOPRAZOLE SCH MG: 30 TABLET, ORALLY DISINTEGRATING, DELAYED RELEASE ORAL at 05:02

## 2017-11-15 RX ADMIN — DOCUSATE SODIUM SCH MG: 100 CAPSULE, LIQUID FILLED ORAL at 09:26

## 2017-11-15 RX ADMIN — DEXAMETHASONE SODIUM PHOSPHATE PRN MG: 10 INJECTION INTRAMUSCULAR; INTRAVENOUS at 09:35

## 2017-11-15 RX ADMIN — METOPROLOL SUCCINATE SCH MG: 25 TABLET, EXTENDED RELEASE ORAL at 09:29

## 2017-11-15 RX ADMIN — Medication SCH ML: at 13:00

## 2017-11-15 RX ADMIN — OXYCODONE AND ACETAMINOPHEN PRN TAB: 5; 325 TABLET ORAL at 10:20

## 2017-11-15 RX ADMIN — DEXAMETHASONE SODIUM PHOSPHATE PRN MG: 10 INJECTION INTRAMUSCULAR; INTRAVENOUS at 16:19

## 2017-11-15 RX ADMIN — QUETIAPINE FUMARATE SCH MG: 25 TABLET, FILM COATED ORAL at 21:28

## 2017-11-15 RX ADMIN — Medication SCH ML: at 05:02

## 2017-11-15 RX ADMIN — ATORVASTATIN CALCIUM SCH MG: 10 TABLET, FILM COATED ORAL at 21:28

## 2017-11-15 RX ADMIN — PROCHLORPERAZINE MALEATE PRN MG: 5 TABLET, FILM COATED ORAL at 10:20

## 2017-11-15 NOTE — PDOC PROGRESS REPORT
Subjective


Progress Note for:: 11/15/17


Subjective:: 





Pt states that she is still having abd pain. 





Physical Exam


Vital Signs: 


 











Temp Pulse Resp BP Pulse Ox


 


 98.9 F   74   16   106/62   90 L


 


 11/15/17 11:35  11/15/17 11:35  11/15/17 11:35  11/15/17 11:35  11/15/17 11:35








 Intake & Output











 11/14/17 11/15/17 11/16/17





 06:59 06:59 06:59


 


Intake Total  1781 


 


Balance  1781 


 


Weight  51.4 kg 51.4 kg











General appearance: PRESENT: no acute distress, thin


Head exam: PRESENT: atraumatic, normocephalic


Eye exam: PRESENT: conjunctiva pink, EOMI, PERRLA.  ABSENT: scleral icterus


Ear exam: PRESENT: normal external ear exam


Mouth exam: PRESENT: moist, tongue midline


Neck exam: ABSENT: carotid bruit, JVD, lymphadenopathy, thyromegaly


Respiratory exam: PRESENT: clear to auscultation [place].  ABSENT: rales, rhonchi, 

wheezes


Cardiovascular exam: PRESENT: RRR.  ABSENT: diastolic murmur, rubs, systolic 

murmur


Pulses: PRESENT: normal dorsalis pedis pul


GI/Abdominal exam: PRESENT: soft, tenderness - epigastric tenderness


Rectal exam: PRESENT: deferred


Extremities exam: PRESENT: full ROM.  ABSENT: calf tenderness, clubbing, pedal 

edema


Neurological exam: PRESENT: alert, altered, oriented to person


Psychiatric exam: PRESENT: appropriate affect, normal mood.  ABSENT: homicidal 

ideation, suicidal ideation


Skin exam: PRESENT: dry, intact, warm.  ABSENT: cyanosis, rash





Results


Laboratory Results: 


 





 11/15/17 04:30 





 11/15/17 04:30 





 











  11/15/17 11/15/17





  04:30 04:30


 


WBC  6.0 


 


RBC  3.50 L 


 


Hgb  11.1 L 


 


Hct  32.1 L 


 


MCV  92 


 


MCH  31.7 


 


MCHC  34.6 


 


RDW  14.1 H 


 


Plt Count  198 


 


Sodium   143.0


 


Potassium   3.7


 


Chloride   108 H


 


Carbon Dioxide   22


 


Anion Gap   13


 


BUN   14


 


Creatinine   1.08


 


Est GFR ( Amer)   > 60


 


Est GFR (Non-Af Amer)   49 L


 


Glucose   112 H


 


Calcium   8.7











Impressions: 


 





Abdomen/Pelvis CT  11/13/17 12:32


IMPRESSION:


1. 6 cm infrarenal aortic aneurysm.


2. Fecal retention.


3. Cholelithiasis.


 














Assessment & Plan





- Diagnosis


(1) Pancreatitis


Qualifiers: 


   Chronicity: acute   Pancreatitis type: unspecified pancreatitis type   Acute 

pancreatitis complication: unspecified   Qualified Code(s): K85.90 - Acute 

pancreatitis without necrosis or infection, unspecified   


Is this a current diagnosis for this admission?: Yes   


Plan: 


Will continue current treatment. 








(2) Constipation


Is this a current diagnosis for this admission?: Yes   


Plan: 


Write for Enema.  Will write for Magnesium Citrate. 








(3) Acute renal injury


Is this a current diagnosis for this admission?: Yes   


Plan: 


in Setting of CKD Stage 2:  Will continue IVF.  Renal function has improved. 








(4) AAA (abdominal aortic aneurysm) without rupture


Is this a current diagnosis for this admission?: Yes   


Plan: 


Supportive care. 








(5) Dementia


Qualifiers: 


   Dementia type: unspecified type   Dementia behavioral disturbance: with 

behavioral disturbance   Qualified Code(s): F03.91 - Unspecified dementia with 

behavioral disturbance   


Is this a current diagnosis for this admission?: Yes   


Plan: 


Supportive Care. 








(6) UTI (urinary tract infection)


Qualifiers: 


   Urinary tract infection type: acute cystitis   Hematuria presence: without 

hematuria   Qualified Code(s): N30.00 - Acute cystitis without hematuria   


Is this a current diagnosis for this admission?: Yes   


Plan: 


Urine culture not consistent with UTI/Acute Cystitis:  Ruled out. 








(7) DNR (do not resuscitate)


Is this a current diagnosis for this admission?: Yes   


Plan: 


Continue current treatment. 








- Time


Time Spent with patient: 15-24 minutes

## 2017-11-16 LAB
ALBUMIN SERPL-MCNC: 3.1 G/DL (ref 3.5–5)
ALP SERPL-CCNC: 95 U/L (ref 38–126)
ALT SERPL-CCNC: 27 U/L (ref 9–52)
ANION GAP SERPL CALC-SCNC: 10 MMOL/L (ref 5–19)
AST SERPL-CCNC: 17 U/L (ref 14–36)
BILIRUB DIRECT SERPL-MCNC: 0.5 MG/DL (ref 0–0.4)
BILIRUB SERPL-MCNC: 0.6 MG/DL (ref 0.2–1.3)
BUN SERPL-MCNC: 12 MG/DL (ref 7–20)
CALCIUM: 8.7 MG/DL (ref 8.4–10.2)
CHLORIDE SERPL-SCNC: 110 MMOL/L (ref 98–107)
CO2 SERPL-SCNC: 23 MMOL/L (ref 22–30)
CREAT SERPL-MCNC: 1.13 MG/DL (ref 0.52–1.25)
GLUCOSE SERPL-MCNC: 108 MG/DL (ref 75–110)
POTASSIUM SERPL-SCNC: 3.9 MMOL/L (ref 3.6–5)
PROT SERPL-MCNC: 6.3 G/DL (ref 6.3–8.2)
SODIUM SERPL-SCNC: 143.4 MMOL/L (ref 137–145)

## 2017-11-16 RX ADMIN — Medication SCH ML: at 13:04

## 2017-11-16 RX ADMIN — METOPROLOL SUCCINATE SCH MG: 25 TABLET, EXTENDED RELEASE ORAL at 11:44

## 2017-11-16 RX ADMIN — QUETIAPINE FUMARATE SCH MG: 25 TABLET, FILM COATED ORAL at 21:21

## 2017-11-16 RX ADMIN — DEXAMETHASONE SODIUM PHOSPHATE PRN MG: 10 INJECTION INTRAMUSCULAR; INTRAVENOUS at 16:48

## 2017-11-16 RX ADMIN — DOCUSATE SODIUM SCH MG: 100 CAPSULE, LIQUID FILLED ORAL at 11:42

## 2017-11-16 RX ADMIN — Medication SCH ML: at 21:21

## 2017-11-16 RX ADMIN — Medication SCH ML: at 05:09

## 2017-11-16 RX ADMIN — ATORVASTATIN CALCIUM SCH MG: 10 TABLET, FILM COATED ORAL at 21:21

## 2017-11-16 RX ADMIN — LANSOPRAZOLE SCH MG: 30 TABLET, ORALLY DISINTEGRATING, DELAYED RELEASE ORAL at 05:09

## 2017-11-16 RX ADMIN — DEXTROSE AND SODIUM CHLORIDE PRN ML: 5; .45 INJECTION, SOLUTION INTRAVENOUS at 12:19

## 2017-11-16 NOTE — PHYSICIAN ADVISORY NOTE
Physician Advisor ProgressNote


.: 


Pursuant to the  plan for [facility], I have reviewed the medical record 

for this patient.  





Physician Advisor Statement: 


 





Please consider documenting, if you agree:


1.  "Acute hypernatremia, likely due to intravascular volume depletion, resolved

"
2.  "Suspected VIKAS, ruled out"  (or document evidence of >0.3mg/dl 

improvement in Cr since IVF)


3.  "underweight with protein-calorie malnutrition [state mild, mod, or severe] 

with BMI  __, ____[?wt loss, ?appetite loss, ]" 


 [if possible, give specifics on intake, wt loss, loss of SQ fat & muscle mass, 

diminished hand  strength,    & clinical importance such as (A) 

nutritional assessment ordered, (B) modified diet or supplements ordered,  (C) 

additional labs ordered, (D) prolonged wound healing time, (E) delayed infxn 

clearance]  





Thanks!


CK

## 2017-11-16 NOTE — PDOC PROGRESS REPORT
Subjective


Progress Note for:: 11/16/17


Subjective:: 





Nursing states that pt had a large bowel movement overnight.  Pt this morning 

states that she is not having any abd pain.  Pt states that she would like to 

eat. 





Physical Exam


Vital Signs: 


 











Temp Pulse Resp BP Pulse Ox


 


 98.8 F   80   16   127/76 H  93 


 


 11/16/17 07:42  11/16/17 07:42  11/16/17 07:42  11/16/17 07:42  11/16/17 07:42








 Intake & Output











 11/15/17 11/16/17 11/17/17





 06:59 06:59 06:59


 


Intake Total 1781 1800 


 


Balance 1781 1800 


 


Weight 51.4 kg 50.2 kg 











General appearance: PRESENT: no acute distress, thin


Head exam: PRESENT: atraumatic, normocephalic


Eye exam: PRESENT: conjunctiva pink, EOMI, PERRLA.  ABSENT: scleral icterus


Ear exam: PRESENT: normal external ear exam


Neck exam: ABSENT: carotid bruit, JVD, lymphadenopathy, thyromegaly


Respiratory exam: PRESENT: clear to auscultation [place].  ABSENT: rales, rhonchi, 

wheezes


Cardiovascular exam: PRESENT: RRR.  ABSENT: diastolic murmur, rubs, systolic 

murmur


Pulses: PRESENT: normal dorsalis pedis pul


Vascular exam: PRESENT: normal capillary refill


GI/Abdominal exam: PRESENT: normal bowel sounds, soft.  ABSENT: distended, 

guarding, mass, organolmegaly, rebound, tenderness


Rectal exam: PRESENT: deferred


Extremities exam: PRESENT: full ROM.  ABSENT: calf tenderness, clubbing, pedal 

edema


Musculoskeletal exam: PRESENT: full ROM


Neurological exam: PRESENT: alert, awake, oriented to person


Psychiatric exam: PRESENT: appropriate affect, normal mood.  ABSENT: homicidal 

ideation, suicidal ideation


Skin exam: PRESENT: dry, intact, warm.  ABSENT: cyanosis, rash





Results


Laboratory Results: 


 





 11/15/17 04:30 





 11/16/17 05:06 





 











  11/16/17





  05:06


 


Sodium  143.4


 


Potassium  3.9


 


Chloride  110 H


 


Carbon Dioxide  23


 


Anion Gap  10


 


BUN  12


 


Creatinine  1.13


 


Est GFR ( Amer)  57 L


 


Est GFR (Non-Af Amer)  47 L


 


Glucose  108


 


Calcium  8.7


 


Total Bilirubin  0.6


 


AST  17


 


ALT  27


 


Alkaline Phosphatase  95


 


Total Protein  6.3


 


Albumin  3.1 L











Impressions: 


 





Abdomen/Pelvis CT  11/13/17 12:32


IMPRESSION:


1. 6 cm infrarenal aortic aneurysm.


2. Fecal retention.


3. Cholelithiasis.


 














Assessment & Plan





- Diagnosis


(1) Pancreatitis


Qualifiers: 


   Chronicity: acute   Pancreatitis type: unspecified pancreatitis type   Acute 

pancreatitis complication: unspecified   Qualified Code(s): K85.90 - Acute 

pancreatitis without necrosis or infection, unspecified   


Is this a current diagnosis for this admission?: Yes   


Plan: 


Will advance diet today.  








(2) Constipation


Is this a current diagnosis for this admission?: Yes   


Plan: 


Will check KUB today.  Nursing and Pt reporting large bowel movement.  








(3) Acute renal injury


Is this a current diagnosis for this admission?: Yes   


Plan: 


in Setting of CKD Stage 2: Resolved.  








(4) AAA (abdominal aortic aneurysm) without rupture


Is this a current diagnosis for this admission?: Yes   


Plan: 


Supportive care. 








(5) Dementia


Qualifiers: 


   Dementia type: unspecified type   Dementia behavioral disturbance: with 

behavioral disturbance   Qualified Code(s): F03.91 - Unspecified dementia with 

behavioral disturbance   


Is this a current diagnosis for this admission?: Yes   


Plan: 


Supportive Care. 








(6) UTI (urinary tract infection)


Qualifiers: 


   Urinary tract infection type: acute cystitis   Hematuria presence: without 

hematuria   Qualified Code(s): N30.00 - Acute cystitis without hematuria   


Is this a current diagnosis for this admission?: Yes   


Plan: 


Urine culture not consistent with UTI/Acute Cystitis:  Ruled out. 








(7) DNR (do not resuscitate)


Is this a current diagnosis for this admission?: Yes   


Plan: 


Continue current treatment. 








- Time


Time Spent with patient: 15-24 minutes

## 2017-11-16 NOTE — RADIOLOGY REPORT (SQ)
EXAM DESCRIPTION:  KUB/ABDOMEN (SINGLE VIEW)



COMPLETED DATE/TIME:  11/16/2017 10:43 am



REASON FOR STUDY:  Constipation A41.9  SEPSIS, UNSPECIFIED ORGANISM K85.80  OTHER ACUTE PANCREATITIS 
WITHOUT NECROSIS OR INFECTIO



COMPARISON:  12/20/2016 KUB

CT ABDOMEN AND PELVIS 11/13/2017



NUMBER OF VIEWS:  One view.



TECHNIQUE:   Supine radiographic image of the abdomen acquired.



LIMITATIONS:  None.



FINDINGS:  BOWEL GAS PATTERN: Persistent large amount of stool in the ascending and transverse colon,
 large amount of stool in the rectum.  Otherwise unremarkable bowel gas pattern.

CALCIFICATIONS: Multiple small calcified gallstones in the right upper quadrant.

SOFT TISSUES: No gross mass or suggestion of organomegaly.

HARDWARE: Midline surgical sutures, faintly radiopaque.

BONES: No acute fracture. No worrisome bone lesions.

OTHER: The under ruptured infrarenal abdominal aortic aneurysm seen on CT exam 11/13/2017 is not appa
rent by plain film



IMPRESSION:  Persistent large amount of stool in the colon

Findings called to Dr. Mccallum



TECHNICAL DOCUMENTATION:  JOB ID:  [id number]

 2011 [employer]- All Rights Reserved

## 2017-11-17 LAB
ANION GAP SERPL CALC-SCNC: 13 MMOL/L (ref 5–19)
BUN SERPL-MCNC: 15 MG/DL (ref 7–20)
CALCIUM: 9 MG/DL (ref 8.4–10.2)
CHLORIDE SERPL-SCNC: 107 MMOL/L (ref 98–107)
CO2 SERPL-SCNC: 23 MMOL/L (ref 22–30)
CREAT SERPL-MCNC: 1.11 MG/DL (ref 0.52–1.25)
GLUCOSE SERPL-MCNC: 90 MG/DL (ref 75–110)
POTASSIUM SERPL-SCNC: 4 MMOL/L (ref 3.6–5)
SODIUM SERPL-SCNC: 142.5 MMOL/L (ref 137–145)

## 2017-11-17 RX ADMIN — QUETIAPINE FUMARATE SCH MG: 25 TABLET, FILM COATED ORAL at 21:52

## 2017-11-17 RX ADMIN — PROCHLORPERAZINE MALEATE PRN MG: 5 TABLET, FILM COATED ORAL at 16:23

## 2017-11-17 RX ADMIN — Medication SCH ML: at 05:27

## 2017-11-17 RX ADMIN — DEXAMETHASONE SODIUM PHOSPHATE PRN MG: 10 INJECTION INTRAMUSCULAR; INTRAVENOUS at 09:55

## 2017-11-17 RX ADMIN — METOPROLOL SUCCINATE SCH MG: 25 TABLET, EXTENDED RELEASE ORAL at 09:55

## 2017-11-17 RX ADMIN — Medication SCH ML: at 13:05

## 2017-11-17 RX ADMIN — PROCHLORPERAZINE MALEATE PRN MG: 5 TABLET, FILM COATED ORAL at 10:06

## 2017-11-17 RX ADMIN — LANSOPRAZOLE SCH MG: 30 TABLET, ORALLY DISINTEGRATING, DELAYED RELEASE ORAL at 05:27

## 2017-11-17 RX ADMIN — ATORVASTATIN CALCIUM SCH MG: 10 TABLET, FILM COATED ORAL at 21:52

## 2017-11-17 RX ADMIN — DOCUSATE SODIUM SCH MG: 100 CAPSULE, LIQUID FILLED ORAL at 09:55

## 2017-11-17 RX ADMIN — Medication SCH ML: at 21:52

## 2017-11-17 NOTE — PDOC PROGRESS REPORT
Subjective


Progress Note for:: 11/17/17


Subjective:: 


Pt states that she is doing well. Nursing states that she would not drank 

Magnesium Citrate or Golytely.  





Physical Exam


Vital Signs: 


 











Temp Pulse Resp BP Pulse Ox


 


 98.5 F   78   20   143/73 H  96 


 


 11/17/17 12:00  11/17/17 12:00  11/17/17 12:00  11/17/17 12:00  11/17/17 12:00








 Intake & Output











 11/16/17 11/17/17 11/18/17





 06:59 06:59 06:59


 


Intake Total 1800 806 


 


Balance 1800 806 


 


Weight 50.2 kg 51.3 kg 











General appearance: PRESENT: no acute distress, thin


Head exam: PRESENT: atraumatic, normocephalic


Eye exam: PRESENT: conjunctiva pink, EOMI, PERRLA.  ABSENT: scleral icterus


Ear exam: PRESENT: normal external ear exam


Mouth exam: PRESENT: moist, tongue midline


Neck exam: ABSENT: carotid bruit, JVD, lymphadenopathy, thyromegaly


Respiratory exam: PRESENT: clear to auscultation [place].  ABSENT: rales, rhonchi, 

wheezes


Cardiovascular exam: PRESENT: RRR.  ABSENT: diastolic murmur, rubs, systolic 

murmur


Pulses: PRESENT: normal dorsalis pedis pul


Vascular exam: PRESENT: normal capillary refill


GI/Abdominal exam: PRESENT: normal bowel sounds, soft.  ABSENT: distended, 

guarding, mass, organolmegaly, rebound, tenderness


Rectal exam: PRESENT: deferred


Extremities exam: PRESENT: full ROM.  ABSENT: calf tenderness, clubbing, pedal 

edema


Neurological exam: PRESENT: alert, altered, awake, oriented to person


Psychiatric exam: PRESENT: appropriate affect, normal mood.  ABSENT: homicidal 

ideation, suicidal ideation


Skin exam: PRESENT: dry, intact, warm.  ABSENT: cyanosis, rash





Results


Laboratory Results: 


 





 11/15/17 04:30 





 11/17/17 05:00 





 











  11/17/17





  05:00


 


Sodium  142.5


 


Potassium  4.0


 


Chloride  107


 


Carbon Dioxide  23


 


Anion Gap  13


 


BUN  15


 


Creatinine  1.11


 


Est GFR ( Amer)  58 L


 


Est GFR (Non-Af Amer)  48 L


 


Glucose  90


 


Calcium  9.0











Impressions: 


 





Abdomen/Pelvis CT  11/13/17 12:32


IMPRESSION:


1. 6 cm infrarenal aortic aneurysm.


2. Fecal retention.


3. Cholelithiasis.


 








KUB X-Ray  11/16/17 00:00


IMPRESSION:  Persistent large amount of stool in the colon


Findings called to Dr. Mccallum


 














Assessment & Plan





- Diagnosis


(1) Constipation


Is this a current diagnosis for this admission?: Yes   


Plan: 


Will place on Miralax and order enema again. Will discharge on miralax daily. 








(2) Pancreatitis


Qualifiers: 


   Chronicity: acute   Pancreatitis type: unspecified pancreatitis type   Acute 

pancreatitis complication: unspecified   Qualified Code(s): K85.90 - Acute 

pancreatitis without necrosis or infection, unspecified   


Is this a current diagnosis for this admission?: Yes   


Plan: 


Resolved.








(3) Acute renal injury


Is this a current diagnosis for this admission?: Yes   


Plan: 


in Setting of CKD Stage 2 initial Cr 1.36:  Pt given IVF and cr improved to 

1.08. 








(4) Acute hypernatremia


Is this a current diagnosis for this admission?: Yes   


Plan: 


Secondary to Dehydration:  Resolved with IVFs








(5) Dehydration


Is this a current diagnosis for this admission?: Yes   


Plan: 


Resolved with IVFs








(6) Moderate protein-calorie malnutrition


Is this a current diagnosis for this admission?: Yes   


Plan: 


Continue to encourage good PO intake. 








(7) AAA (abdominal aortic aneurysm) without rupture


Is this a current diagnosis for this admission?: Yes   


Plan: 


Will arrange pt with Interventional Radiology at [facility] [phone]. 








(8) Dementia


Qualifiers: 


   Dementia type: unspecified type   Dementia behavioral disturbance: with 

behavioral disturbance   Qualified Code(s): F03.91 - Unspecified dementia with 

behavioral disturbance   


Is this a current diagnosis for this admission?: Yes   


Plan: 


Supportive Care. 








(9) UTI (urinary tract infection)


Qualifiers: 


   Urinary tract infection type: acute cystitis   Hematuria presence: without 

hematuria   Qualified Code(s): N30.00 - Acute cystitis without hematuria   


Is this a current diagnosis for this admission?: Yes   


Plan: 


Urine culture not consistent with UTI/Acute Cystitis:  Ruled out. 








(10) DNR (do not resuscitate)


Is this a current diagnosis for this admission?: Yes   


Plan: 


Continue current treatment. 








- Time


Time Spent with patient: Less than 15 minutes

## 2017-11-18 VITALS — DIASTOLIC BLOOD PRESSURE: 65 MMHG | SYSTOLIC BLOOD PRESSURE: 104 MMHG

## 2017-11-18 RX ADMIN — METOPROLOL SUCCINATE SCH MG: 25 TABLET, EXTENDED RELEASE ORAL at 09:25

## 2017-11-18 RX ADMIN — LANSOPRAZOLE SCH MG: 30 TABLET, ORALLY DISINTEGRATING, DELAYED RELEASE ORAL at 06:59

## 2017-11-18 RX ADMIN — Medication SCH ML: at 06:59

## 2017-11-18 RX ADMIN — DOCUSATE SODIUM SCH MG: 100 CAPSULE, LIQUID FILLED ORAL at 09:25

## 2017-11-18 NOTE — PDOC TRANSFER SUMMARY
General





- Admit/Disc Date/PCP


Admission Date/Primary Care Provider: 


  11/14/17 15:32





  NO LOCALMD





Discharge Date: 11/18/17





- Discharge Diagnosis


(1) Constipation


Is this a current diagnosis for this admission?: Yes   


Summary: 


Will continue on Miralax and colace daily.  








(2) Pancreatitis


Is this a current diagnosis for this admission?: Yes   


Summary: 


Resolved








(3) Acute renal injury


Is this a current diagnosis for this admission?: Yes   


Summary: 


Encourage good PO intake.  








(4) Acute hypernatremia


Is this a current diagnosis for this admission?: Yes   


Summary: 


In setting of Dehydration:  Resolved. 








(5) Dehydration


Is this a current diagnosis for this admission?: Yes   


Summary: 


Resolved. 








(6) Moderate protein-calorie malnutrition


Is this a current diagnosis for this admission?: Yes   


Summary: 


Encourage good PO intake. 








(7) AAA (abdominal aortic aneurysm) without rupture


Is this a current diagnosis for this admission?: Yes   


Summary: 


Pt will need to be arrange with Interventional Radiology at [facility] 

[phone]. 








(8) Dementia


Is this a current diagnosis for this admission?: Yes   


Summary: 


Supportive care. 








(9) UTI (urinary tract infection)


Is this a current diagnosis for this admission?: Yes   


Summary: 


Ruled out








(10) DNR (do not resuscitate)


Is this a current diagnosis for this admission?: Yes   





- Additional Information


Resuscitation Status: Do Not Resuscitate


Discharge Diet: Cardiac


Discharge Activity: Activity As Tolerated


Home Medications: 








Atorvastatin Calcium [Lipitor 10 mg Tablet] 10 mg PO QHS 11/13/17 


Metoprolol Succinate [Toprol Xl 25 mg Tab.sr] 12.5 mg PO DAILY 11/13/17 


Omeprazole 40 mg PO Q6AM 11/13/17 


Quetiapine Fumarate [Seroquel] 50 mg PO QHS 11/13/17 


Docusate Sodium [Colace 100 mg Capsule] 100 mg PO DAILY  capsule 11/18/17 


Polyethylene Glycol 3350 [Miralax Powder 17 gm/Packet] 17 gm PO DAILY  

powd.pack 11/18/17 











History of Present Illness


Admission Date/PCP: 


  11/14/17 15:32





  NO LOCALMD





Patient complains of: ABD pain


History of Present Illness: 





Pt is a 77 year old female admitted for abd pain.  Pt was found to have 

pancreatitis and constipation. 





Hospital Course


Hospital Course: 





Pt is a 77 year old female who was admitted for abd pain for 3 days.  Pt was 

found to have Pancreatitis and placed on IVF and made NPO.  Pt did well during 

hospitalization.   Pt was found to be dehydrated and was placed on IVFs.  Pt 

was also found to have acute renal injury.  These issues resolved with IVFs.  

Pt was found to be constipated.  Pt was placed on colace and miralax.  Pt was 

given enema which resulted into multiple bowel movement. Pt was found to have 

AAA and will need to follow up with [facility]iology for intervention in 1-2 

weeks. 





Physical Exam


Vital Signs: 


 











Temp Pulse Resp BP Pulse Ox


 


 97.4 F   85   18   92/47 L  99 


 


 11/18/17 08:28  11/18/17 08:28  11/18/17 08:28  11/18/17 08:28  11/18/17 08:28








 Intake & Output











 11/17/17 11/18/17 11/19/17





 06:59 06:59 06:59


 


Intake Total 806 785 


 


Output Total  200 


 


Balance 806 585 


 


Weight 51.3 kg 51.7 kg 











General appearance: PRESENT: no acute distress, thin


Head exam: PRESENT: atraumatic, normocephalic


Eye exam: PRESENT: conjunctiva pink, EOMI, PERRLA.  ABSENT: scleral icterus


Ear exam: PRESENT: normal external ear exam


Mouth exam: PRESENT: moist, tongue midline


Neck exam: ABSENT: carotid bruit, JVD, lymphadenopathy, thyromegaly


Respiratory exam: PRESENT: clear to auscultation [place].  ABSENT: rales, rhonchi, 

wheezes


Cardiovascular exam: PRESENT: RRR.  ABSENT: diastolic murmur, rubs, systolic 

murmur


Pulses: PRESENT: normal dorsalis pedis pul


Vascular exam: PRESENT: normal capillary refill


GI/Abdominal exam: PRESENT: normal bowel sounds, soft.  ABSENT: distended, 

guarding, mass, organolmegaly, rebound, tenderness


Rectal exam: PRESENT: deferred


Extremities exam: PRESENT: full ROM.  ABSENT: calf tenderness, clubbing, pedal 

edema


Neurological exam: PRESENT: alert, altered, awake, oriented to person, CN II-

XII grossly intact


Psychiatric exam: PRESENT: appropriate affect, normal mood.  ABSENT: homicidal 

ideation, suicidal ideation


Skin exam: PRESENT: dry, intact, warm.  ABSENT: cyanosis, rash





Results


Laboratory Results: 


 





 11/15/17 04:30 





 11/17/17 05:00 








Impressions: 


 





Abdomen/Pelvis CT  11/13/17 12:32


IMPRESSION:


1. 6 cm infrarenal aortic aneurysm.


2. Fecal retention.


3. Cholelithiasis.


 








KUB X-Ray  11/16/17 00:00


IMPRESSION:  Persistent large amount of stool in the colon


Findings called to Dr. Mccallum


 














Transfer Plan





- Disposition


Transfer Plan: 





[facility]





- Time Spent with Patient


Time spent with patient: Greater than 30 Minutes





Plan


Time Spent: Greater than 30 Minutes

## 2017-12-26 ENCOUNTER — HOSPITAL ENCOUNTER (EMERGENCY)
Dept: HOSPITAL 62 - ER | Age: 77
Discharge: HOME | End: 2017-12-26
Payer: MEDICARE

## 2017-12-26 VITALS — SYSTOLIC BLOOD PRESSURE: 143 MMHG | DIASTOLIC BLOOD PRESSURE: 78 MMHG

## 2017-12-26 DIAGNOSIS — R10.10: ICD-10-CM

## 2017-12-26 DIAGNOSIS — R50.9: Primary | ICD-10-CM

## 2017-12-26 LAB
ALBUMIN SERPL-MCNC: 4 G/DL (ref 3.5–5)
ALP SERPL-CCNC: 170 U/L (ref 38–126)
ALT SERPL-CCNC: 27 U/L (ref 9–52)
ANION GAP SERPL CALC-SCNC: 13 MMOL/L (ref 5–19)
AST SERPL-CCNC: 38 U/L (ref 14–36)
BASOPHILS # BLD AUTO: 0.1 10^3/UL (ref 0–0.2)
BASOPHILS NFR BLD AUTO: 1.1 % (ref 0–2)
BILIRUB DIRECT SERPL-MCNC: 0.4 MG/DL (ref 0–0.4)
BILIRUB SERPL-MCNC: 0.9 MG/DL (ref 0.2–1.3)
BUN SERPL-MCNC: 26 MG/DL (ref 7–20)
CALCIUM: 9.6 MG/DL (ref 8.4–10.2)
CHLORIDE SERPL-SCNC: 102 MMOL/L (ref 98–107)
CO2 SERPL-SCNC: 27 MMOL/L (ref 22–30)
CREAT SERPL-MCNC: 1.58 MG/DL (ref 0.52–1.25)
EOSINOPHIL # BLD AUTO: 0 10^3/UL (ref 0–0.6)
EOSINOPHIL NFR BLD AUTO: 0.1 % (ref 0–6)
ERYTHROCYTE [DISTWIDTH] IN BLOOD BY AUTOMATED COUNT: 14.6 % (ref 11.5–14)
GLUCOSE SERPL-MCNC: 133 MG/DL (ref 75–110)
HCT VFR BLD CALC: 34.4 % (ref 36–47)
HGB BLD-MCNC: 11.4 G/DL (ref 12–15.5)
HGB HCT DIFFERENCE: -0.2
LIPASE SERPL-CCNC: 141.8 U/L (ref 23–300)
LYMPHOCYTES # BLD AUTO: 0.9 10^3/UL (ref 0.5–4.7)
LYMPHOCYTES NFR BLD AUTO: 8.6 % (ref 13–45)
MCH RBC QN AUTO: 30.8 PG (ref 27–33.4)
MCHC RBC AUTO-ENTMCNC: 33.2 G/DL (ref 32–36)
MCV RBC AUTO: 93 FL (ref 80–97)
MONOCYTES # BLD AUTO: 0.8 10^3/UL (ref 0.1–1.4)
MONOCYTES NFR BLD AUTO: 7.5 % (ref 3–13)
NEUTROPHILS # BLD AUTO: 8.9 10^3/UL (ref 1.7–8.2)
NEUTS SEG NFR BLD AUTO: 82.7 % (ref 42–78)
POTASSIUM SERPL-SCNC: 4.2 MMOL/L (ref 3.6–5)
PROT SERPL-MCNC: 8.1 G/DL (ref 6.3–8.2)
RBC # BLD AUTO: 3.72 10^6/UL (ref 3.72–5.28)
SODIUM SERPL-SCNC: 141.7 MMOL/L (ref 137–145)
WBC # BLD AUTO: 10.8 10^3/UL (ref 4–10.5)

## 2017-12-26 PROCEDURE — 87040 BLOOD CULTURE FOR BACTERIA: CPT

## 2017-12-26 PROCEDURE — 80053 COMPREHEN METABOLIC PANEL: CPT

## 2017-12-26 PROCEDURE — 36415 COLL VENOUS BLD VENIPUNCTURE: CPT

## 2017-12-26 PROCEDURE — 83690 ASSAY OF LIPASE: CPT

## 2017-12-26 PROCEDURE — 85025 COMPLETE CBC W/AUTO DIFF WBC: CPT

## 2017-12-26 PROCEDURE — 71020: CPT

## 2017-12-26 PROCEDURE — 99285 EMERGENCY DEPT VISIT HI MDM: CPT

## 2017-12-26 NOTE — ER DOCUMENT REPORT
ED General





- General


Chief Complaint: Fever


Stated Complaint: FEVER


Time Seen by Provider: 12/26/17 10:04


Notes: 





Patient was brought to the emergency department from her residence at the [facility], 

for fever.  It was noted to be 101.9 at that facility this morning.  She was 

given Tylenol.  EMS was called and they noted a temp of 101.  They also noted 

the patient said she had some abdominal pains, indicates upper mid abdomen.  

Upon arrival at this facility, her temp is 99.8.  Patient is comfortable and 

does not appear to be in pain.  Patient denies any other symptoms.  She does 

follow commands appropriately, but is very confused and her answers to 

questions or not appropriate or helpful.  She denies any pain elsewhere than 

her abdomen.  She is not able to provide any other worthwhile history.





Patient is a DNR patient.








TRAVEL OUTSIDE OF THE [place] IN LAST 30 DAYS: No





- Related Data


Allergies/Adverse Reactions: 


 





No Known Allergies Allergy (Verified 11/13/17 17:41)


 











Past Medical History





- Social History


Smoking Status: Never Smoker


Chew tobacco use (# tins/day): No


Frequency of alcohol use: None


Drug Abuse: None


Lives with: Nursing Home


Family History: Reviewed & Not Pertinent


Patient has suicidal ideation: No


Patient has homicidal ideation: No





- Past Medical History


Cardiac Medical History: Reports: Hx Coronary Artery Disease, Hx 

Hypercholesterolemia, Hx Hypertension, Other - Stents and CABG


GI Medical History: Reports: Hx Gastroesophageal Reflux Disease


Musculoskeltal Medical History: Reports Hx Arthritis


Psychiatric Medical History: Reports: Hx Dementia - Alzheimer's


Past Surgical History: Reports: Hx Abdominal Surgery





- Immunizations


Hx Diphtheria, Pertussis, Tetanus Vaccination: No





Review of Systems





- Review of Systems


-: Yes ROS unobtainable due to patient's medical condition - Dementia, only 

provides mis-information and inappropriate info


Constitutional: Fever





Physical Exam





- Vital signs


Vitals: 


 











Pulse Resp


 


 101 H  18 


 


 12/26/17 10:01  12/26/17 10:01











Interpretation: Other - Temp 99.3 at triage





- Notes


Notes: 





PHYSICAL EXAMINATION:





GENERAL: Well-appearing, in no acute distress.  Follows verbal commands well.  

However, conversation is confused and not appropriate.





HEAD: Atraumatic, normocephalic.





EYES: Pupils equal round and reactive to light, extraocular movements intact.





ENT: oropharynx clear without exudates.  Tongue appears to be dry.





NECK: Normal range of motion, supple.





LUNGS: Breath sounds clear and equal bilaterally.





HEART: Regular rate and rhythm without murmurs.





ABDOMEN: Soft, minor tenderness in the mid abdomen.  No guarding and no 

rebound.  No mass felt.  No bruit heard.


Reviewing patient's records, she has a history of a abdominal aortic aneurysm 

about 6 cm in size located in the infra-renal area. 





EXTREMITIES: Normal range of motion without pain.





NEUROLOGICAL:  Normal speech, normal gait.  Normal sensory, motor, and reflex 

exams.  Patient is disoriented to where she is in when it is.





SKIN: Warm, dry, no rashes.





Course





- Re-evaluation


Re-evalutation: 





12/26/17 19:14


Patient was unable to provide us with a urine specimen.  She not only declined 

to have a Glass catheter placed, she absolutely refused and said that she would 

not allow it to happen.





I do not see a compelling reason that we have to force this elderly dementia 

patient to have a catheter placed.  She does not have a fever at this time 

during her stay.  Her white cell count is not elevated.  I doubt that she has a 

UTI, but on the possibility that that might be what her fever is due to, I have 

opted to empirically treat her with a dose of Levaquin for 5 days that would 

treat a UTI if there does have to be one.  I think the risk of inappropriately 

treating is outweighed by the benefit of making sure this patient does not end 

up being septic or overwhelming infection.





Patient has a very large abdominal aortic aneurysm and is actually scheduled to 

have a repeat study done on it in 2 days.  I do not think there is any 

indication for doing that study at this time as this patient is probably not 

going to have anything done with the aneurysm given her mental status and the 

fact that she is a DNR.  This study is being ordered to assess the situation 

and nothing more.








- Vital Signs


Vital signs: 


 











Temp Pulse Resp BP Pulse Ox


 


 100.0 F   101 H  16   143/78 H  94 


 


 12/26/17 18:23  12/26/17 16:00  12/26/17 18:23  12/26/17 16:00  12/26/17 18:23














- Laboratory


Result Diagrams: 


 12/26/17 11:30





 12/26/17 11:30


Laboratory results interpreted by me: 


 











  12/26/17 12/26/17





  11:30 11:30


 


WBC  10.8 H 


 


Hgb  11.4 L 


 


Hct  34.4 L 


 


RDW  14.6 H 


 


Seg Neutrophils %  82.7 H 


 


Lymphocytes %  8.6 L 


 


Absolute Neutrophils  8.9 H 


 


BUN   26 H


 


Creatinine   1.58 H


 


Est GFR ( Amer)   38 L


 


Est GFR (Non-Af Amer)   32 L


 


Glucose   133 H


 


AST   38 H


 


Alkaline Phosphatase   170 H














Discharge





- Discharge


Clinical Impression: 


 Fever





Condition: Stable


Disposition: HOME, SELF-CARE


Additional Instructions: 


FEVER:





     Fever is the body's reaction to infection.  Fever can also occur with 

illnesses that create fever-producing substances in the body.  By itself, fever 

is not harmful.  It helps the body fight invading germs. We are more concerned 

with: 


(1) What's causing the fever? 


(2) How can we keep you more comfortable until the fever goes away?


     Early in an illness, symptoms are often so vague that a diagnosis can't be 

made.  If the doctor hasn't identified a clear cause for your fever, you will 

probably develop new symptoms within the next two days. Contact the doctor if 

you develop severe worsening headache, rash, chest pain, cough with yellow or 

green sputum, difficulty breathing, abdominal pain, or other new symptoms.


     There is no reason to treat a fever if you're comfortable.  If the fever 

is causing aches, headache, and fatigue, you can treat it with ibuprofen (Advil

, Nuprin, etc) or acetaminophen (Tylenol). Follow the directions on the bottle.


     Get plenty of liquids (three quarts per day).  Rest.  Physical work or 

sports will raise the temperature higher and make you feel much worse.  Dress 

lightly.


     If you're chilling, this means the temperature is trying to go higher.  

Take ibuprofen or acetaminophen.  When you feel sweaty and "feverish" the 

temperature is coming down.


     If the fever doesn't go away within two days or if you become more ill, 

call the doctor or return at once for re-examination.








Except for a urine test which you could not provide us, you had a NORMAL EXAM 

AND WORKUP:


     At this time, with the exception of fever, your examination and workup 

show no significant abnormality.  No significant abnormal physical findings 

were noted.  All laboratory, EKG, and imaging (x-ray, CT scans, ultrasound) 

studies that were ordered show no significant abnormality.


     Although your examination and all studies that were ordered showed no 

significant abnormal finding, there are no examinations and no studies that are 

100% accurate.  There is always the possibility that some abnormality could 

exist and not be detected with physical examination or within the limits and 

capabilities of laboratory and other studies.


     You should return or follow up as you were instructed on your visit today 

for further evaluation if your symptoms do not resolve.





You could possibly have a URINARY TRACT INFECTION:


 This is due to germs growing in the bladder.  This is a common problem.


     This infection usually responds quickly to antibiotics.  Your antibiotic 

should be taken exactly as prescribed.  Drink plenty of fluids -- three to four 

quarts a day.


     Occasionally, a bladder anesthetic will be prescribed to help stop the 

feeling of urgency until the antibiotic has a chance to clear the infection.  

This may cause your urine to be dark orange.


     Certain urine infections require a culture.  If the doctor obtained a 

culture, the results will be back in two days.  You should call to see if a 

change in treatment is needed.


     A repeat urinalysis after you finish treatment is often recommended.  The 

physician will let you know if further testing is required.


     Call the doctor if you develop fever, chills, flank pain, inability to 

urinate, or blood in the urine.








ANTIBIOTIC THERAPY:


     You have been given an antibiotic prescription.  It's important that you 

take all the medication, unless instructed otherwise by your physician.  

Failure to complete the entire course can result in relapse of your condition.


     Common side effects of antibiotics include nausea, intestinal cramping, or 

diarrhea.  Women may develop vaginal yeast infections, and babies can get yeast 

(thrush) in the mouth following the use of antibiotics.  Contact your physician 

if you develop significant side effects from this medication.


     Allergy to this antibiotic can result in hives, wheezing, faintness, or 

itching.  If symptoms of allergy occur, stop the medication and call the doctor.








LEVOFLOXACIN:


     You have been given an antibacterial agent, levofloxacin (Levaquin).  This 

medicine is not related to the penicillins, sulfas, cephalosporins, or 

tetracyclines.  It is often given to patients who are allergic to these drugs.  

It has been chosen for you either because other drugs are not appropriate, or 

because of the nature of your problem.


     Levaquin should not be taken with antacids, as these can decrease its 

effectiveness.  It can be taken without regard to meals.  LEVAQUIN SHOULD NOT 

BE TAKEN BY CHILDREN, NURSING WOMEN, OR PREGNANT WOMEN.


     Although Levaquin is usually well-tolerated, common side effects can 

include nausea and diarrhea.  Contact your doctor if you experience any unusual 

symptoms while on this medication, such as joint pain or swelling, shortness of 

breath, wheezing, faintness, or hives.








USE OF ACETAMINOPHEN (Tylenol):


     Acetaminophen may be taken for pain relief or fever control. It's much 

safer than aspirin, offering a wider range of "safe" dosages.  It is safe 

during pregnancy.  Some brand names are Tylenol, Panadol, Datril, Anacin 3, 

Tempra, and Liquiprin. Acetaminophen can be repeated every four hours.  The 

following are maximum recommended dosages:





WEIGHT         Dose             Drops                  Elixir        Chewable(

80mg)


(LBS.)                            drprs=droppers    tsp=teaspoon








>89 pounds or adults          650 mg to 900 mg





Acetaminophen can be repeated every four hours.  Maximum dose not to exceed 

4000 mg a day.





   These maximum recommended dosages are slightly higher than the dosages 

written on the product container, but these dosages are very safe and below the 

toxic dosage for acetaminophen.








FOLLOW-UP CARE:


If you have been referred to a physician for follow-up care, call the physician

s office for an appointment as you were instructed or within the next two days.

  If you experience worsening or a significant change in your symptoms, notify 

the physician immediately or return to the Emergency Department at any time for 

re-evaluation.





Prescriptions: 


Levofloxacin [Levaquin 750 mg Tablet] 750 mg PO DAILY #4 tablet


Referrals: 


MOOKIE DOBBINS MD [Primary Care Provider] - Follow up as needed

## 2017-12-26 NOTE — RADIOLOGY REPORT (SQ)
EXAM DESCRIPTION:  CHEST PA/LAT



COMPLETED DATE/TIME:  12/26/2017 11:15 am



REASON FOR STUDY:  Fever



COMPARISON:  None.



EXAM PARAMETERS:  NUMBER OF VIEWS: two views

TECHNIQUE: Digital Frontal and Lateral radiographic views of the chest acquired.

RADIATION DOSE: NA

LIMITATIONS: none



FINDINGS:  LUNGS AND PLEURA: No opacities, masses or pneumothorax. No pleural effusion.

MEDIASTINUM AND HILAR STRUCTURES: No masses or contour abnormalities.

HEART AND VASCULAR STRUCTURES: Heart normal size.  No evidence for failure.

BONES: No acute findings.

HARDWARE: None in the chest.

OTHER: No other significant finding.



IMPRESSION:  NO SIGNIFICANT RADIOGRAPHIC FINDING IN THE CHEST.



TECHNICAL DOCUMENTATION:  JOB ID:  [id number]

 2011 [employer]- All Rights Reserved

## 2017-12-28 ENCOUNTER — HOSPITAL ENCOUNTER (OUTPATIENT)
Dept: HOSPITAL 62 - RAD | Age: 77
End: 2017-12-28
Attending: SURGERY
Payer: MEDICARE

## 2017-12-28 DIAGNOSIS — I71.4: Primary | ICD-10-CM

## 2017-12-28 PROCEDURE — 74176 CT ABD & PELVIS W/O CONTRAST: CPT

## 2017-12-28 PROCEDURE — 82565 ASSAY OF CREATININE: CPT

## 2017-12-28 NOTE — RADIOLOGY REPORT (SQ)
EXAM DESCRIPTION:  CT ABD/PELVIS NO ORAL OR IV



COMPLETED DATE/TIME:  12/28/2017 10:38 am



REASON FOR STUDY:  I71.4 ABDOMINAL AORTIC ANEURYSM, WITHOUT RUPTURE I71.4  ABDOMINAL AORTIC ANEURYSM,
 WITHOUT RUPTURE



COMPARISON:  11/13/2017.



TECHNIQUE:  CT scan of the abdomen and pelvis performed without intravenous or oral contrast. Images 
reviewed with lung, soft tissue, and bone windows. Reconstructed coronal and sagittal MPR images revi
ewed. All images stored on PACS.

All CT scanners at this facility use dose modulation, iterative reconstruction, and/or weight based d
osing when appropriate to reduce radiation dose to as low as reasonably achievable (ALARA).

[facility]: Dose Right  [facility]: CareDose    [facility]: Dose Right    [facility]: Teradose 4D    [facility]: Smart [place]



RADIATION DOSE:  CT Rad equipment meets quality standard of care and radiation dose reduction techniq
ues were employed. CTDIvol: 2.5 mGy. DLP: 122 mGy-cm.mGy.



LIMITATIONS:  None.



FINDINGS:  LOWER CHEST: No significant findings. No nodules or infiltrates.

NON-CONTRASTED LIVER, SPLEEN, ADRENALS: Evaluation limited by lack of IV contrast. No identified sign
ificant masses.

PANCREAS: No masses. No peripancreatic inflammatory changes.

GALLBLADDER: Gallstones. No inflammatory changes to suggest cholecystitis.

RIGHT KIDNEY AND URETER: No suspicious masses. Assessment limited by lack of IV contrast.   No signif
icant calcifications.   No hydronephrosis or hydroureter.

LEFT KIDNEY AND URETER: No suspicious masses. Assessment limited by lack of IV contrast.   No signifi
cant calcifications.   No hydronephrosis or hydroureter.

AORTA AND RETROPERITONEUM: Ectatic aorta.  Infrarenal abdominal aortic aneurysm with maximum transver
se measurement of 6 cm, unchanged.  Heterogenous appearance along the periphery secondary to mural th
rombus. No retroperitoneal masses or adenopathy.

BOWEL AND PERITONEAL CAVITY: Large amount of stool throughout the colon and rectum.  Significant dist
ention of the rectum with stool, displacing the bladder anteriorly.  No obvious masses or inflammator
y changes. No free fluid.

APPENDIX: Not visualized.

PELVIS, BLADDER, AND ABDOMINAL WALL:Again seen are midline surgical clips in the lower abdominal wall
.  No abnormal masses. No free fluid. Bladder normal.

BONES: No significant findings.  Chronic changes in the spine with compression deformity of L1, uncha
nged.

OTHER: No other significant finding.



IMPRESSION:

1. INFRARENAL ABDOMINAL AORTIC ANEURYSM WITH TRANSVERSE DIAMETER OF 6.0 CM.  PRIOR MEASUREMENT WAS 5.
8 CM.

2. LARGE AMOUNT OF STOOL THROUGHOUT THE COLON.  THE RECTUM IS MARKEDLY DISTENDED WITH STOOL, DISPLACI
NG THE BLADDER ANTERIORLY.  THIS MAY REPRESENT A FECAL IMPACTION.

3. GALLSTONES.

4. NO OTHER SIGNIFICANT OR ACUTE PROCESS IN THE ABDOMEN OR PELVIS.



COMMENT:  The original order was for CTA abdomen pelvis with endograft protocol.  The patient's curre
nt creatinine is 2.3.  Prior measurement on 11/13/2017 was 1.3.  Due to decline in renal function, co
ntrast was not given.

Quality ID # 436: Final reports with documentation of one or more dose reduction techniques (e.g., Au
tomated exposure control, adjustment of the mA and/or kV according to patient size, use of iterative 
reconstruction technique)



TECHNICAL DOCUMENTATION:  JOB ID:  [id number]

 2011 [employer]- All Rights Reserved

## 2018-01-10 ENCOUNTER — HOSPITAL ENCOUNTER (OUTPATIENT)
Dept: HOSPITAL 62 - OD | Age: 78
End: 2018-01-10
Attending: PHYSICIAN ASSISTANT
Payer: MEDICARE

## 2018-01-10 DIAGNOSIS — I10: ICD-10-CM

## 2018-01-10 DIAGNOSIS — I70.90: ICD-10-CM

## 2018-01-10 DIAGNOSIS — Z01.812: Primary | ICD-10-CM

## 2018-01-10 LAB
ANION GAP SERPL CALC-SCNC: 12 MMOL/L (ref 5–19)
BUN SERPL-MCNC: 23 MG/DL (ref 7–20)
CALCIUM: 9.8 MG/DL (ref 8.4–10.2)
CHLORIDE SERPL-SCNC: 105 MMOL/L (ref 98–107)
CO2 SERPL-SCNC: 28 MMOL/L (ref 22–30)
GLUCOSE SERPL-MCNC: 126 MG/DL (ref 75–110)
POTASSIUM SERPL-SCNC: 4.2 MMOL/L (ref 3.6–5)
SODIUM SERPL-SCNC: 144.9 MMOL/L (ref 137–145)

## 2018-01-10 PROCEDURE — 80048 BASIC METABOLIC PNL TOTAL CA: CPT

## 2018-01-10 PROCEDURE — 36415 COLL VENOUS BLD VENIPUNCTURE: CPT

## 2018-01-13 ENCOUNTER — HOSPITAL ENCOUNTER (EMERGENCY)
Dept: HOSPITAL 62 - ER | Age: 78
LOS: 1 days | Discharge: HOME | End: 2018-01-14
Payer: MEDICARE

## 2018-01-13 DIAGNOSIS — F03.90: ICD-10-CM

## 2018-01-13 DIAGNOSIS — S93.492A: Primary | ICD-10-CM

## 2018-01-13 DIAGNOSIS — I10: ICD-10-CM

## 2018-01-13 DIAGNOSIS — I25.10: ICD-10-CM

## 2018-01-13 DIAGNOSIS — Z87.891: ICD-10-CM
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
## 2018-01-13 PROCEDURE — 99283 EMERGENCY DEPT VISIT LOW MDM: CPT

## 2018-01-14 NOTE — RADIOLOGY REPORT (SQ)
EXAM DESCRIPTION:  ANKLE LEFT AP/LATERAL



COMPLETED DATE/TIME:  1/14/2018 12:03 am



REASON FOR STUDY:  lat swelling



COMPARISON:  None.



NUMBER OF VIEWS:  Two views.



TECHNIQUE:  AP and lateral radiographic images acquired of the left ankle.



LIMITATIONS:  None.



FINDINGS:  MINERALIZATION: Normal.

BONES:  There is evidence of a linear bony density adjacent to base of the talus laterally at the jonnie
ofibular joint.  The possibility of a small avulsion fracture cannot be excluded.  Follow-up with obl
ique views could be useful if indicated

JOINTS: No effusions.

SOFT TISSUES: Soft tissue swelling medially and laterally.  Changes of an ankle effusion. .

OTHER: No other significant finding.



IMPRESSION:  Soft tissue swelling left ankle with left ankle effusion.  Suspect avulsion fracture at 
talofibular joint.  Oblique views could be useful.



TECHNICAL DOCUMENTATION:  JOB ID:  [id number]

[id number]

 2011 [employer]- All Rights Reserved

## 2018-01-27 ENCOUNTER — HOSPITAL ENCOUNTER (OUTPATIENT)
Dept: HOSPITAL 62 - ER | Age: 78
Setting detail: OBSERVATION
LOS: 1 days | Discharge: OTHER INSTITUTION | End: 2018-01-28
Attending: INTERNAL MEDICINE | Admitting: INTERNAL MEDICINE
Payer: MEDICARE

## 2018-01-27 DIAGNOSIS — K21.9: ICD-10-CM

## 2018-01-27 DIAGNOSIS — E78.5: ICD-10-CM

## 2018-01-27 DIAGNOSIS — Z78.1: ICD-10-CM

## 2018-01-27 DIAGNOSIS — K80.20: ICD-10-CM

## 2018-01-27 DIAGNOSIS — I71.4: ICD-10-CM

## 2018-01-27 DIAGNOSIS — Z98.890: ICD-10-CM

## 2018-01-27 DIAGNOSIS — K56.41: Primary | ICD-10-CM

## 2018-01-27 DIAGNOSIS — I25.10: ICD-10-CM

## 2018-01-27 DIAGNOSIS — K44.9: ICD-10-CM

## 2018-01-27 DIAGNOSIS — Z79.82: ICD-10-CM

## 2018-01-27 DIAGNOSIS — G30.9: ICD-10-CM

## 2018-01-27 DIAGNOSIS — N13.30: ICD-10-CM

## 2018-01-27 DIAGNOSIS — Z66: ICD-10-CM

## 2018-01-27 DIAGNOSIS — F02.81: ICD-10-CM

## 2018-01-27 DIAGNOSIS — Z79.899: ICD-10-CM

## 2018-01-27 DIAGNOSIS — I10: ICD-10-CM

## 2018-01-27 LAB
ADD MANUAL DIFF: NO
ALBUMIN SERPL-MCNC: 3.7 G/DL (ref 3.5–5)
ALP SERPL-CCNC: 101 U/L (ref 38–126)
ALT SERPL-CCNC: 15 U/L (ref 9–52)
ANION GAP SERPL CALC-SCNC: 8 MMOL/L (ref 5–19)
AST SERPL-CCNC: 25 U/L (ref 14–36)
BASOPHILS # BLD AUTO: 0.1 10^3/UL (ref 0–0.2)
BASOPHILS NFR BLD AUTO: 0.9 % (ref 0–2)
BILIRUB DIRECT SERPL-MCNC: 0.3 MG/DL (ref 0–0.4)
BILIRUB SERPL-MCNC: 0.6 MG/DL (ref 0.2–1.3)
BUN SERPL-MCNC: 20 MG/DL (ref 7–20)
CALCIUM: 9.5 MG/DL (ref 8.4–10.2)
CHLORIDE SERPL-SCNC: 107 MMOL/L (ref 98–107)
CO2 SERPL-SCNC: 27 MMOL/L (ref 22–30)
EOSINOPHIL # BLD AUTO: 0.1 10^3/UL (ref 0–0.6)
EOSINOPHIL NFR BLD AUTO: 1.1 % (ref 0–6)
ERYTHROCYTE [DISTWIDTH] IN BLOOD BY AUTOMATED COUNT: 14.5 % (ref 11.5–14)
GLUCOSE SERPL-MCNC: 100 MG/DL (ref 75–110)
HCT VFR BLD CALC: 34.5 % (ref 36–47)
HGB BLD-MCNC: 11.3 G/DL (ref 12–15.5)
LIPASE SERPL-CCNC: 188.6 U/L (ref 23–300)
LYMPHOCYTES # BLD AUTO: 1.4 10^3/UL (ref 0.5–4.7)
LYMPHOCYTES NFR BLD AUTO: 21.8 % (ref 13–45)
MCH RBC QN AUTO: 30.3 PG (ref 27–33.4)
MCHC RBC AUTO-ENTMCNC: 32.9 G/DL (ref 32–36)
MCV RBC AUTO: 92 FL (ref 80–97)
MONOCYTES # BLD AUTO: 0.5 10^3/UL (ref 0.1–1.4)
MONOCYTES NFR BLD AUTO: 7.3 % (ref 3–13)
NEUTROPHILS # BLD AUTO: 4.5 10^3/UL (ref 1.7–8.2)
NEUTS SEG NFR BLD AUTO: 68.9 % (ref 42–78)
PLATELET # BLD: 176 10^3/UL (ref 150–450)
POTASSIUM SERPL-SCNC: 4 MMOL/L (ref 3.6–5)
PROT SERPL-MCNC: 7.4 G/DL (ref 6.3–8.2)
RBC # BLD AUTO: 3.75 10^6/UL (ref 3.72–5.28)
SODIUM SERPL-SCNC: 142.1 MMOL/L (ref 137–145)
TOTAL CELLS COUNTED % (AUTO): 100 %
WBC # BLD AUTO: 6.6 10^3/UL (ref 4–10.5)

## 2018-01-27 PROCEDURE — 74177 CT ABD & PELVIS W/CONTRAST: CPT

## 2018-01-27 PROCEDURE — 85025 COMPLETE CBC W/AUTO DIFF WBC: CPT

## 2018-01-27 PROCEDURE — G0378 HOSPITAL OBSERVATION PER HR: HCPCS

## 2018-01-27 PROCEDURE — 80053 COMPREHEN METABOLIC PANEL: CPT

## 2018-01-27 PROCEDURE — 87086 URINE CULTURE/COLONY COUNT: CPT

## 2018-01-27 PROCEDURE — 99285 EMERGENCY DEPT VISIT HI MDM: CPT

## 2018-01-27 PROCEDURE — 96374 THER/PROPH/DIAG INJ IV PUSH: CPT

## 2018-01-27 PROCEDURE — 83690 ASSAY OF LIPASE: CPT

## 2018-01-27 PROCEDURE — 36415 COLL VENOUS BLD VENIPUNCTURE: CPT

## 2018-01-27 PROCEDURE — 82272 OCCULT BLD FECES 1-3 TESTS: CPT

## 2018-01-27 PROCEDURE — 96375 TX/PRO/DX INJ NEW DRUG ADDON: CPT

## 2018-01-27 PROCEDURE — 96361 HYDRATE IV INFUSION ADD-ON: CPT

## 2018-01-27 RX ADMIN — FAMOTIDINE SCH MG: 20 TABLET, FILM COATED ORAL at 23:44

## 2018-01-27 RX ADMIN — Medication SCH ML: at 23:44

## 2018-01-27 RX ADMIN — LACTULOSE SCH GM: 20 SOLUTION ORAL at 23:43

## 2018-01-27 NOTE — RADIOLOGY REPORT (SQ)
EXAM DESCRIPTION:  CT ABD/PELVIS WITH IV ONLY



COMPLETED DATE/TIME:  1/27/2018 2:10 pm



REASON FOR STUDY:  lower abdominal pain, h/o AAA, recent laparoscopic



COMPARISON:  CT abdomen and pelvis 12/28/2017.



TECHNIQUE:  CT scan of the abdomen and pelvis performed using helical scanning technique with dynamic
 intravenous contrast injection.  No oral contrast. Images reviewed with lung, soft tissue, and bone 
windows. Reconstructed coronal and sagittal MPR images reviewed. Delayed images for evaluation of the
 urinary system also acquired. All images stored on PACS.

All CT scanners at this facility use dose modulation, iterative reconstruction, and/or weight based d
osing when appropriate to reduce radiation dose to as low as reasonably achievable (ALARA).

[facility]: Dose Right  [facility]: CareDose    [facility]: Dose Right    [facility]: Teradose 4D    [facility]: Smart [employer]



CONTRAST TYPE AND DOSE:  contrast/concentration: Isovue  mg/ml; Total Contrast Delivered: 53.0 ml; To
jonnie Saline Delivered: 62.0 ml



RENAL FUNCTION:  Creatinine 1.28.



RADIATION DOSE:  CT Rad equipment meets quality standard of care and radiation dose reduction techniq
ues were employed. CTDIvol: 4.8 - 5.5 mGy. DLP: 534 mGy-cm..



LIMITATIONS:  None.



FINDINGS:  LOWER CHEST: Mild atelectasis at the lung bases.  No pleural effusion.  There is a moderat
e hiatal hernia.

LIVER: Normal size. No masses.  Mild intrahepatic biliary ductal dilation.

SPLEEN: Normal size.

PANCREAS: No significant calcifications. No adjacent inflammation or peripancreatic fluid collections
. Pancreatic duct not dilated.

GALLBLADDER: The gallbladder is distended.  There is cholelithiasis.

ADRENAL GLANDS: No significant masses or asymmetry.

RIGHT KIDNEY AND URETER: No solid masses.   No significant calcifications.   Interval development of 
severe hydronephrosis and dilation of the right ureter.

LEFT KIDNEY AND URETER: No solid masses.   No significant calcifications.   No hydronephrosis or hydr
oureter.

AORTA AND VESSELS: Tortuous thoracic aorta with the infrarenal abdominal aortic aneurysm measuring 6.
1 (AP) x 6.1 (transverse) x 6.5 (craniocaudal) cm with the large intraluminal thrombus, previously me
asure 6.0 cm.  Atherosclerotic calcifications throughout the abdominal aorta and its branches.

RETROPERITONEUM: No retroperitoneal adenopathy, hemorrhage or masses.

BOWEL AND PERITONEAL CAVITY: No dilated small bowel loops.  Large amount of stool throughout the colo
n.  Large stool ball at the rectosigmoid colon.

APPENDIX: Not visualized.

PELVIS: The urinary bladder is distended.  The urinary bladder is displaced anteriorly and superiorly
 by the distended rectosigmoid colon.  No pelvic mass.  No free fluid.

ABDOMINAL WALL: Suture material is noted within the anterior abdominal wall.  Mild soft tissue strand
ing at the right inguinal region, may be secondary to recent procedure.

BONES: Multilevel degenerative changes in the spine.



IMPRESSION:  1.  6.1 cm infrarenal abdominal aortic aneurysm, previously measures 6.0 cm.  Vascular s
urgery consultation recommended.

2. Large amount of stool throughout the colon with a large stool ball at the rectosigmoid colon, sugg
estive of severe constipation/fecal impaction.

3. Interval development of severe right-sided hydroureteronephrosis, probably due to the mass effect 
from the above findings.

4. Distended urinary bladder, displaced by the distended rectosigmoid colon.

5. Distended gallbladder with cholelithiasis.  Mild intrahepatic biliary ductal dilation.  Please cor
relate with laboratory values and clinical concern for acute cholecystitis.

6.  Moderate hiatal hernia.



TECHNICAL DOCUMENTATION:  JOB ID:  [id number]

[id number]

Quality ID # [id number]: Final reports with documentation of one or more dose reduction techniques (e.g., Au
tomated exposure control, adjustment of the mA and/or kV according to patient size, use of iterative 
reconstruction technique)

 2011 [employer]- All Rights Reserved

## 2018-01-27 NOTE — PDOC H&P
History of Present Illness


Admission Date/PCP: 


  01/27/18 19:37





  MOOKIE DOBBINS MD





Patient complains of: Abdominal pain


History of Present Illness: 


YOKO ACEVEDO is a 77 year old female with known 6 cm abdominal aortic aneurysm 

developed abdominal pain.  She was sent to our emergency room for evaluation.  

CT revealed her to be obstipated with pressure on the ureter causing 

hydronephrosis.  There was no evidence of leakage from the aneurysm.  Patient 

is admitted for disimpaction.








Past Medical History


Cardiac Medical History: Reports: Coronary Artery Disease, Hyperlipidema, 

Hypertension


GI Medical History: Reports: Gastroesophageal Reflux Disease


Musculoskeltal Medical History: Reports: Arthritis


Psychiatric Medical History: Reports: Dementia - Alzheimer's





Past Surgical History


Past Surgical History: Reports: None





Social History


Information Source: Patient


Lives with: Nursing Home


Smoking Status: Unknown if Ever Smoked


Frequency of Alcohol Use: None


Hx Recreational Drug Use: No


Drugs: None


Hx Prescription Drug Abuse: No





- Advance Directive


Resuscitation Status: Do Not Resuscitate


Surrogate healthcare decision maker:: 





Patient has a legal guardian





Family History


Family History: Reviewed & Not Pertinent, Other - Patient suffers from dementia


Parental Family History Reviewed: No


Children Family History Reviewed: No


Sibling(s) Family History Reviewed.: No





Medication/Allergy


Home Medications: 








Atorvastatin Calcium [Lipitor 10 mg Tablet] 10 mg PO QHS 11/13/17 


Metoprolol Succinate [Toprol Xl 25 mg Tab.sr] 12.5 mg PO DAILY 11/13/17 


Omeprazole 40 mg PO Q6AM 11/13/17 


Quetiapine Fumarate [Seroquel] 50 mg PO QHS 11/13/17 


Docusate Sodium [Colace 100 mg Capsule] 100 mg PO DAILY  capsule 11/18/17 


Polyethylene Glycol 3350 [Miralax Powder 17 gm/Packet] 17 gm PO DAILY  

powd.pack 11/18/17 


Levofloxacin [Levaquin 750 mg Tablet] 750 mg PO DAILY #4 tablet 12/26/17 








Allergies/Adverse Reactions: 


 





No Known Allergies Allergy (Verified 01/27/18 10:16)


 











Review of Systems


All systems: reviewed and no additional remarkable complaints except as stated


Gastrointestinal: PRESENT: abdominal pain





Physical Exam


Vital Signs: 


 











Temp Pulse Resp BP Pulse Ox


 


 97.6 F   54 L  16   119/56 L  96 


 


 01/27/18 18:11  01/27/18 18:11  01/27/18 18:11  01/27/18 18:11  01/27/18 18:11











General appearance: PRESENT: no acute distress, cooperative, thin


Head exam: PRESENT: atraumatic, normocephalic


Eye exam: PRESENT: EOMI, PERRLA


Ear exam: PRESENT: normal external ear exam


Neck exam: ABSENT: carotid bruit, JVD, lymphadenopathy, thyromegaly


Respiratory exam: PRESENT: clear to auscultation [place].  ABSENT: rales, rhonchi, 

wheezes


Cardiovascular exam: PRESENT: RRR.  ABSENT: diastolic murmur, rubs, systolic 

murmur


GI/Abdominal exam: PRESENT: normal bowel sounds, soft.  ABSENT: distended, 

guarding, mass, organolmegaly, rebound, tenderness


Rectal exam: PRESENT: deferred


Extremities exam: PRESENT: full ROM.  ABSENT: calf tenderness, clubbing, pedal 

edema


Neurological exam: PRESENT: alert, awake, oriented to person, CN II-XII grossly 

intact.  ABSENT: motor sensory deficit


Psychiatric exam: PRESENT: appropriate affect, normal mood


Focused psych exam: PRESENT: paranoid


Skin exam: PRESENT: dry, intact, warm.  ABSENT: cyanosis, rash





Results


Laboratory Results: 





 











  01/27/18 01/27/18 01/27/18





  09:50 11:42 11:42


 


WBC   6.6 


 


Hgb   11.3 L 


 


Hct   34.5 L 


 


Plt Count   176 


 


Sodium    142.1


 


Potassium    4.0


 


BUN    20


 


Creatinine    1.33 H


 


Glucose    100


 


Calcium    9.5


 


Albumin    3.7


 


Lipase    188.6


 


Stool Occult Blood  NEGATIVE  








Impressions: 


 





Abdomen/Pelvis CT  01/27/18 10:02


IMPRESSION:  1.  6.1 cm infrarenal abdominal aortic aneurysm, previously 

measures 6.0 cm.  Vascular surgery consultation recommended.


2. Large amount of stool throughout the colon with a large stool ball at the 

rectosigmoid colon, suggestive of severe constipation/fecal impaction.


3. Interval development of severe right-sided hydroureteronephrosis, probably 

due to the mass effect from the above findings.


4. Distended urinary bladder, displaced by the distended rectosigmoid colon.


5. Distended gallbladder with cholelithiasis.  Mild intrahepatic biliary ductal 

dilation.  Please correlate with laboratory values and clinical concern for 

acute cholecystitis.


6.  Moderate hiatal hernia.


 














Assessment & Plan





- Diagnosis


(1) DNR (do not resuscitate)


Is this a current diagnosis for this admission?: Yes   





(2) Fecal impaction in rectum


Is this a current diagnosis for this admission?: Yes   





(3) Dementia


Qualifiers: 


   Dementia type: unspecified type   Dementia behavioral disturbance: with 

behavioral disturbance   Qualified Code(s): F03.91 - Unspecified dementia with 

behavioral disturbance   


Is this a current diagnosis for this admission?: Yes   





(4) Hydroureteronephrosis


Is this a current diagnosis for this admission?: Yes   





(5) AAA (abdominal aortic aneurysm) without rupture


Is this a current diagnosis for this admission?: Yes   





- Time


Time Spent: 30 to 50 Minutes





- Plan Summary


Plan Summary: 





Patient will be placed in observation and given laxatives and enemas.  Surgery 

has been consulted for manual disimpaction should this prove necessary.  Patient

's DNR will be honored.

## 2018-01-27 NOTE — ER DOCUMENT REPORT
ED General





- General


Stated Complaint: VOMITING


Time Seen by Provider: 01/27/18 09:39


TRAVEL OUTSIDE OF THE [place] IN LAST 30 DAYS: No





- HPI


Notes: 





Patient is a 77-year-old female with a history of a 6 cm infrarenal AAA, 

dementia who presents to the ED from the nursing home complaining of lower 

abdominal pain, nausea, and vomiting.   We were told that patient had a 

catheterization procedure evaluation of the AAA yesterday and they went through 

the groin.  Patient states that she has had pain since then.  There is a 

subjective report of possible coffee-ground emesis from the facility.  Patient 

states that the pain in her stomach stays lower does not radiate otherwise.  

Thorough HPI is otherwise limited due to the dementia.  Denies any other drug 

allergies. 


pt denies following but does have dementia as a disclaimer to take with a grain 

of salt: Denies any headache, fever, neck pain, URI, sore throat, chest pain, 

palpitations, syncope, cough, shortness of breath, wheeze, dyspnea, dysuria, 

hematuria, numbness/tingling, saddle anesthesia, muscle paralysis/weakness, or 

rash.








History prior to discussing with her  and power of .





- Related Data


Allergies/Adverse Reactions: 


 





No Known Allergies Allergy (Verified 01/27/18 10:16)


 











Past Medical History





- General


Cannot obtain history due to: Dementia





- Social History


Smoking Status: Unknown if Ever Smoked


Family History: Reviewed & Not Pertinent





- Past Medical History


Cardiac Medical History: Reports: Hx Coronary Artery Disease, Hx 

Hypercholesterolemia, Hx Hypertension


Renal/ Medical History: Denies: Hx Peritoneal Dialysis


GI Medical History: Reports: Hx Gastroesophageal Reflux Disease


Musculoskeltal Medical History: Reports Hx Arthritis


Psychiatric Medical History: Reports: Hx Dementia - Alzheimer's


Past Surgical History: Reports: Hx Abdominal Surgery





- Immunizations


Hx Diphtheria, Pertussis, Tetanus Vaccination: No





Review of Systems





- Review of Systems


Notes: 





see hpi.


-: Yes ROS unobtainable due to patient's medical condition





Physical Exam





- Vital signs


Vitals: 


 











Temp Pulse Resp BP Pulse Ox


 


 97.5 F   70   16   141/67 H  98 


 


 01/27/18 09:42  01/27/18 09:42  01/27/18 09:42  01/27/18 09:42  01/27/18 09:42














- Notes


Notes: 





PHYSICAL EXAMINATION:





GENERAL: Well-appearing, well-nourished and in no acute distress.  Accompanied 

by female nurse.  Alert to person/place.





LUNGS: Breath sounds clear to auscultation bilaterally and equal.  No wheezes 

rales or rhonchi.





HEART: Regular rate and rhythm without murmurs, rubs, gallops.





ABDOMEN: Soft, nontender, nondistended abdomen.  No guarding, no rebound.  No 

masses appreciated.  Normal bowel sounds present.  No CVA tenderness 

bilaterally.  wound where laparoscopic surgery to place to the groin is w/o 

obvious purulence, abscess, or streaks.  





Musculoskeletal: FROM to passive/active. Strength 5+/5. 





Extremities:  No cyanosis, clubbing, or edema b/l.  Peripheral pulses 2+.  

Capillary refill less than 3 seconds.





NEUROLOGICAL: Normal speech. Normal sensory, motor exams 





PSYCH: Normal mood, normal affect.





SKIN: Warm, Dry, normal turgor, no rashes or lesions noted.





Course





- Re-evaluation


Re-evalutation: 





01/27/18 13:17


Patient is a 77-year-old female with dementia who is incompetent to make 

decisions by herself per her power of  Ms. simmons.  





Patient became very unruly and uncooperative as they were trying to obtain a 

urine.  Pt is currently confused.  Patient then put on her boots, grabbed her 

things, and started storming out of her exam room with just her depends on and 

a shirt.  Initially we were unsuccessful at getting a hold of her power of 

, but she was able to call us back within about 5 minutes.  Security 

did escort the patient back to her room; although, patient became violent and 

did try to punch our security personnel.  We did place her in soft restraints.  

I did thoroughly review this case with Ms. Simmons when she called me back.  Ms. Simmons is agreeable to our current restraints and treatment plan and would like 

us to complete our investigation.  Ms. simmons states that she does believe that 

medical personnel are "out to her her" and that she does act like this often.  

She did request that we give her something to help calm her down which I am 

agreeable to.  Patient has Haldol as her as needed medication so we will give 

her some Haldol IV.  Dr. Alejandre was also consulted who is agreeable to the 

current plan and treatment.  





Patient's transport provider did arrive to check on Rosita.  I did update Rosita 

as to why she is in soft restraints.  Rosita states that she does act like this 

often and is in understanding.








01/27/18 15:01


See CT scan results.


Growing AAA from 6.0-6.1cm


Very large stool ball the rectosigmoid colon causing severe constipation/fecal 

impaction secondarily most likely causing the severe right-sided 

hydroureteronephrosis as well as the distended urinary bladder.  3-year-old 

distended gallbladder with cholelithiasis is also noted


With mild intrahepatic biliary ductal dilation and a moderate hiatal hernia.


I reviewed this case with Dr. Alejandre who recommended consult surgery and 

probable admission.


I also verbalized the results with the radiologist recommended evacuation, Glass

, and follow-up ultrasounds.  I did discuss with Dr. Escobar the general surgeon 

who will evaluate the patient and wanted to know if we could do a Gastrografin 

enema.  I then talk to radiology again who will see what they can do, but we 

will wait for the consult with a surgeon.





01/27/18 16:05


We are still waiting for the Gen Surgeon consult.


Pt is starting to become uncooperative again, but we have been able to leave 

the restraints off at this time.


I did review with her Power of  who will be coming to the ED.  She gave 

verbal permission for any procedures to help with her CT findings/symptoms.  

She was eval'd yesterday for possible stent treatment for her AAA by Dr. Barnard in [place] who told her P.O.A. that she is a good candidate.





01/27/18 17:13


Dr. Escobar Gen surgeon did consult with the patient, but patient would not let 

him touch her.  Surgeon states that this is currently a medical issue and he is 

happy to consult with admittance.  I did review with Dr. Mccallum who will 

accept the patient after discussion with the POA about our limited resources 

here for the AAA should that aneurysm rupture.  Pt is currently a DNR.





01/27/18 18:19


Dr. Mccallum referred the acceptance to the next hospitalist up who was Dr. Ni.  Dr. Ni declined admission stating "too many things going on." 

She advised start reglan and fluids (which patient is just now letting us due 

now that her POA arrived).  





I did call [facility] to talk with Vascular first then hospitalist for transfer.


POA in agreement.





01/27/18 19:23


I spoke with Dr. Barrientos, vascular surgeon, from [facility] who stated that 

even with her current AAA, she would not need a consult by them if they were at 

her hospital being treated for her fecal impaction and other stated issues.  





I spoke to Dr. Teixeira our night hospitalist who accepted this patient without 

any problems or quarrels.





- Vital Signs


Vital signs: 


 











Temp Pulse Resp BP Pulse Ox


 


 97.6 F   54 L  16   119/56 L  96 


 


 01/27/18 18:11  01/27/18 18:11  01/27/18 18:11  01/27/18 18:11  01/27/18 18:11














- Laboratory


Result Diagrams: 


 01/27/18 11:42





 01/27/18 11:42


Laboratory results interpreted by me: 


 











  01/27/18 01/27/18





  11:42 11:42


 


Hgb  11.3 L 


 


Hct  34.5 L 


 


RDW  14.5 H 


 


Creatinine   1.33 H


 


Est GFR ( Amer)   47 L


 


Est GFR (Non-Af Amer)   39 L














Discharge





- Discharge


Clinical Impression: 


 Fecal impaction in rectum, Hydroureteronephrosis





AAA (abdominal aortic aneurysm)


Qualifiers:


 Presence of rupture: without rupture Qualified Code(s): I71.4 - Abdominal 

aortic aneurysm, without rupture





Dementia


Qualifiers:


 Dementia type: unspecified type Dementia behavioral disturbance: with 

behavioral disturbance Qualified Code(s): F03.91 - Unspecified dementia with 

behavioral disturbance





Condition: Stable


Disposition: ADMITTED AS OBSERVATION


Admitting Provider: Hospitalist - Dr. Teixeira


Referrals: 


MOOKIE DOBBINS MD [Primary Care Provider] - Follow up as needed

## 2018-01-28 VITALS — DIASTOLIC BLOOD PRESSURE: 86 MMHG | SYSTOLIC BLOOD PRESSURE: 128 MMHG

## 2018-01-28 RX ADMIN — LACTULOSE SCH GM: 20 SOLUTION ORAL at 11:18

## 2018-01-28 RX ADMIN — FAMOTIDINE SCH MG: 20 TABLET, FILM COATED ORAL at 09:17

## 2018-01-28 RX ADMIN — Medication SCH ML: at 06:21

## 2018-01-28 RX ADMIN — Medication SCH ML: at 14:08

## 2018-01-28 RX ADMIN — LACTULOSE SCH GM: 20 SOLUTION ORAL at 06:20

## 2018-01-28 NOTE — PDOC TRANSFER SUMMARY
General





- Admit/Disc Date/PCP


Admission Date/Primary Care Provider: 


  01/27/18 19:37





  MOOKIE DOBBINS MD





Discharge Date: 01/28/18





- Discharge Diagnosis








(2) Dementia


Is this a current diagnosis for this admission?: Yes   





(3) Fecal impaction in rectum


Is this a current diagnosis for this admission?: Yes   





(4) Hydroureteronephrosis


Is this a current diagnosis for this admission?: Yes   





- Additional Information


Resuscitation Status: Do Not Resuscitate


Discharge Diet: As Tolerated


Discharge Activity: Activity As Tolerated


Home Medications: 








Acetaminophen [Non-Aspirin] 2 tab PO Q4HP PRN 01/28/18 


Aspirin [Aspirin EC] 81 mg PO DAILY 01/28/18 


Atorvastatin Calcium 10 mg PO DAILY 01/28/18 


Cyanocobalamin (Vitamin B-12) [Vitamin B-12] 1,000 mcg PO DAILY 01/28/18 


Docusate Sodium 100 mg PO BID 01/28/18 


Fluticasone Propionate [Flonase Nasal Spray 50 Mcg/Spray 16 gm] 2 sprays NASL 

DAILY 01/28/18 


Guaifenesin [Robafen] 10 ml PO Q4HP PRN 01/28/18 


Haloperidol [Haldol 0.5 mg Tablet] 0.5 mg PO Q6HP PRN 01/28/18 


Magnesium Hydroxide [Milk of Magnesia 30 ml Udcup] 30 ml PO DAILYP PRN 01/28/18 


Metoprolol Succinate [Toprol Xl] 12.5 mg PO DAILY 01/28/18 


Nitroglycerin [Nitrostat] 0.4 mg SL Q5M PRN 01/28/18 


Omeprazole 40 mg PO DAILY 01/28/18 


Polyethylene Glycol 3350 [Miralax Powder 17 gm/Packet] 1 packet PO DAILY 01/28/ 18 


Quetiapine Fumarate [Seroquel] 50 mg PO QHS 01/28/18 


Sennosides [Senna Lax] 2 tab PO QHS 01/28/18 


Thiamine Mononitrate (Vit B1) [Vitamin B-1] 100 mg PO DAILY 01/28/18 











History of Present Illness


Admission Date/PCP: 


  01/27/18 19:37





  MOOKIE DOBBINS MD





History of Present Illness: 


YOKO ACEVEDO is a 77 year old female with known 6 cm abdominal aortic aneurysm 

developed abdominal pain.  She was sent to our emergency room for evaluation.  

CT revealed her to be obstipated with pressure on the ureter causing 

hydronephrosis.  There was no evidence of leakage from the aneurysm.  Patient 

is admitted for disimpaction.








Hospital Course


Hospital Course: 


Patient was admitted.  Patient was started on IV fluids and Reglan.  Patient 

was given an enema.  Patient was then started on lactulose.  Patient has since 

had multiple bowel movements.  Surgery was consulted to disimpact patient 

however patient was already having multiple bowel movements also she refused to 

be examined in that way.  Patient states she feels much better now today after 

having had multiple bowel movements.  Please note that patient dementia, 

hydroureteronephrosis and abdominal aortic aneurysm.  At this time patient is 

ready to return back to her facility.








Physical Exam


Vital Signs: 


 











Temp Pulse Resp BP Pulse Ox


 


 98.9 F   89   16   128/86 H  98 


 


 01/28/18 07:39  01/28/18 07:39  01/28/18 07:39  01/28/18 07:39  01/28/18 07:39








 Intake & Output











 01/27/18 01/28/18 01/29/18





 06:59 06:59 06:59


 


Intake Total  0 578


 


Output Total  100 


 


Balance  -100 578


 


Weight  49.4 kg 














Results


Laboratory Results: 





 











  01/27/18 01/27/18 01/27/18





  09:50 11:42 11:42


 


WBC   6.6 


 


RBC   3.75 


 


Hgb   11.3 L 


 


Hct   34.5 L 


 


MCV   92 


 


MCH   30.3 


 


MCHC   32.9 


 


RDW   14.5 H 


 


Plt Count   176 


 


Seg Neutrophils %   68.9 


 


Lymphocytes %   21.8 


 


Monocytes %   7.3 


 


Eosinophils %   1.1 


 


Basophils %   0.9 


 


Absolute Neutrophils   4.5 


 


Absolute Lymphocytes   1.4 


 


Absolute Monocytes   0.5 


 


Absolute Eosinophils   0.1 


 


Absolute Basophils   0.1 


 


Sodium    142.1


 


Potassium    4.0


 


Chloride    107


 


Carbon Dioxide    27


 


Anion Gap    8


 


BUN    20


 


Creatinine    1.33 H


 


Est GFR ( Amer)    47 L


 


Est GFR (Non-Af Amer)    39 L


 


Glucose    100


 


Calcium    9.5


 


Total Bilirubin    0.6


 


Direct Bilirubin    0.3


 


AST    25


 


ALT    15


 


Alkaline Phosphatase    101


 


Total Protein    7.4


 


Albumin    3.7


 


Lipase    188.6


 


Stool Occult Blood  NEGATIVE  








Impressions: 


 





Abdomen/Pelvis CT  01/27/18 10:02


IMPRESSION:  1.  6.1 cm infrarenal abdominal aortic aneurysm, previously 

measures 6.0 cm.  Vascular surgery consultation recommended.


2. Large amount of stool throughout the colon with a large stool ball at the 

rectosigmoid colon, suggestive of severe constipation/fecal impaction.


3. Interval development of severe right-sided hydroureteronephrosis, probably 

due to the mass effect from the above findings.


4. Distended urinary bladder, displaced by the distended rectosigmoid colon.


5. Distended gallbladder with cholelithiasis.  Mild intrahepatic biliary ductal 

dilation.  Please correlate with laboratory values and clinical concern for 

acute cholecystitis.


6.  Moderate hiatal hernia.


 











Status: Imported from [facility]





Transfer Plan





- Disposition


Transfer Plan: 


Patient is being transferred back to her facility to continue her care.  

Patient should remain on a bowel regimen which she appears to already be on 

with Colace, milk of magnesia and polyethylene glycol.





- Time Spent with Patient


Time spent with patient: Less than 30 Minutes





Qualifiers


**PATEINT BEING DISCHARGED WITH ANY OF THE FOLLOWING DIAGNOSIS?: No

## 2018-01-28 NOTE — PROGRESS NOTE
Provider Note


Provider Note: 





Consulted for fecal disimpaction. Patient has had multiple BM's overnight.  She 

refuses to allow a rectal exam.  This is the 2nd visit that she has refused 

examination. 





With patient having BM's I doubt that she needs futher help with elimination.





Surgey will sign off.

## 2018-02-10 ENCOUNTER — HOSPITAL ENCOUNTER (EMERGENCY)
Dept: HOSPITAL 62 - ER | Age: 78
LOS: 1 days | Discharge: HOME | End: 2018-02-11
Payer: MEDICARE

## 2018-02-10 VITALS — DIASTOLIC BLOOD PRESSURE: 79 MMHG | SYSTOLIC BLOOD PRESSURE: 140 MMHG

## 2018-02-10 DIAGNOSIS — I71.4: Primary | ICD-10-CM

## 2018-02-10 DIAGNOSIS — N30.00: ICD-10-CM

## 2018-02-10 DIAGNOSIS — R10.9: ICD-10-CM

## 2018-02-10 LAB
ADD MANUAL DIFF: NO
ALBUMIN SERPL-MCNC: 3.6 G/DL (ref 3.5–5)
ALP SERPL-CCNC: 102 U/L (ref 38–126)
ALT SERPL-CCNC: 18 U/L (ref 9–52)
ANION GAP SERPL CALC-SCNC: 9 MMOL/L (ref 5–19)
APPEARANCE UR: (no result)
APTT PPP: YELLOW S
AST SERPL-CCNC: 20 U/L (ref 14–36)
BASOPHILS # BLD AUTO: 0.1 10^3/UL (ref 0–0.2)
BASOPHILS NFR BLD AUTO: 1.1 % (ref 0–2)
BILIRUB DIRECT SERPL-MCNC: 0.4 MG/DL (ref 0–0.4)
BILIRUB SERPL-MCNC: 0.4 MG/DL (ref 0.2–1.3)
BILIRUB UR QL STRIP: NEGATIVE
BUN SERPL-MCNC: 19 MG/DL (ref 7–20)
CALCIUM: 9.3 MG/DL (ref 8.4–10.2)
CHLORIDE SERPL-SCNC: 105 MMOL/L (ref 98–107)
CO2 SERPL-SCNC: 27 MMOL/L (ref 22–30)
EOSINOPHIL # BLD AUTO: 0.1 10^3/UL (ref 0–0.6)
EOSINOPHIL NFR BLD AUTO: 1.7 % (ref 0–6)
ERYTHROCYTE [DISTWIDTH] IN BLOOD BY AUTOMATED COUNT: 14.5 % (ref 11.5–14)
GLUCOSE SERPL-MCNC: 102 MG/DL (ref 75–110)
GLUCOSE UR STRIP-MCNC: NEGATIVE MG/DL
HCT VFR BLD CALC: 34.1 % (ref 36–47)
HGB BLD-MCNC: 11.4 G/DL (ref 12–15.5)
KETONES UR STRIP-MCNC: NEGATIVE MG/DL
LIPASE SERPL-CCNC: 369.6 U/L (ref 23–300)
LYMPHOCYTES # BLD AUTO: 1.7 10^3/UL (ref 0.5–4.7)
LYMPHOCYTES NFR BLD AUTO: 27 % (ref 13–45)
MCH RBC QN AUTO: 30.3 PG (ref 27–33.4)
MCHC RBC AUTO-ENTMCNC: 33.4 G/DL (ref 32–36)
MCV RBC AUTO: 91 FL (ref 80–97)
MONOCYTES # BLD AUTO: 0.5 10^3/UL (ref 0.1–1.4)
MONOCYTES NFR BLD AUTO: 7.1 % (ref 3–13)
NEUTROPHILS # BLD AUTO: 4.1 10^3/UL (ref 1.7–8.2)
NEUTS SEG NFR BLD AUTO: 63.1 % (ref 42–78)
NITRITE UR QL STRIP: NEGATIVE
PH UR STRIP: 6 [PH] (ref 5–9)
PLATELET # BLD: 287 10^3/UL (ref 150–450)
POTASSIUM SERPL-SCNC: 3.7 MMOL/L (ref 3.6–5)
PROT SERPL-MCNC: 7.1 G/DL (ref 6.3–8.2)
PROT UR STRIP-MCNC: 100 MG/DL
RBC # BLD AUTO: 3.77 10^6/UL (ref 3.72–5.28)
SODIUM SERPL-SCNC: 141 MMOL/L (ref 137–145)
SP GR UR STRIP: 1.01
TOTAL CELLS COUNTED % (AUTO): 100 %
UROBILINOGEN UR-MCNC: 2 MG/DL (ref ?–2)
WBC # BLD AUTO: 6.4 10^3/UL (ref 4–10.5)

## 2018-02-10 PROCEDURE — 36415 COLL VENOUS BLD VENIPUNCTURE: CPT

## 2018-02-10 PROCEDURE — 81001 URINALYSIS AUTO W/SCOPE: CPT

## 2018-02-10 PROCEDURE — 80053 COMPREHEN METABOLIC PANEL: CPT

## 2018-02-10 PROCEDURE — 85025 COMPLETE CBC W/AUTO DIFF WBC: CPT

## 2018-02-10 PROCEDURE — 87086 URINE CULTURE/COLONY COUNT: CPT

## 2018-02-10 PROCEDURE — 99285 EMERGENCY DEPT VISIT HI MDM: CPT

## 2018-02-10 PROCEDURE — 83690 ASSAY OF LIPASE: CPT

## 2018-02-10 NOTE — ER DOCUMENT REPORT
ED General





- General


Chief Complaint: Abdominal Pain


Stated Complaint: ABDOMINAL PAIN


Time Seen by Provider: 02/10/18 18:26


Cannot obtain history due to: Dementia


Notes: 





Patient is a 77-year-old female with history of advanced dementia with 

associated aggressive behaviors who presents by EMS with facility concerns of 

abdominal distention and not having had a bowel movement for 3 days.  The 

facility denied that the patient complained of pain.  The patient herself is 

profoundly demented, states that she was at a friend's house today when she 

began to have some abdominal discomfort.  However to me at this time she denies 

any complaints whatsoever and states "I like to get the hell out of here".  No 

additional history can be obtained as there is no family or staff and the 

facility the bedside the patient's dementia prevents her from providing 

appropriate history.


TRAVEL OUTSIDE OF THE [place] IN LAST 30 DAYS: No





- Related Data


Allergies/Adverse Reactions: 


 





No Known Allergies Allergy (Verified 01/27/18 10:16)


 











Past Medical History





- General


Information source: Transfer Record, Emergency Med Personnel


Cannot obtain history due to: Dementia





- Social History


Smoking Status: Never Smoker


Frequency of alcohol use: None


Drug Abuse: None


Lives with: Nursing Home


Family History: Reviewed & Not Pertinent, Other - Patient suffers from dementia


Patient has suicidal ideation: No


Patient has homicidal ideation: No





- Past Medical History


Cardiac Medical History: Reports: Hx Coronary Artery Disease, Hx 

Hypercholesterolemia, Hx Hypertension


Renal/ Medical History: Denies: Hx Peritoneal Dialysis


GI Medical History: Reports: Hx Gastroesophageal Reflux Disease


Musculoskeltal Medical History: Reports Hx Arthritis


Psychiatric Medical History: Reports: Hx Dementia - Alzheimer's


Past Surgical History: Reports: Hx Abdominal Surgery





- Immunizations


Hx Diphtheria, Pertussis, Tetanus Vaccination: No





Review of Systems





- Review of Systems


Notes: 





Constitutional: Negative for fever.


HENT: Negative for sore throat.


Eyes: Negative for visual changes.


Cardiovascular: Negative for chest pain.


Respiratory: Negative for shortness of breath.


Gastrointestinal: Negative for abdominal pain, vomiting or diarrhea.


Genitourinary: Negative for dysuria.


Musculoskeletal: Negative for back pain.


Skin: Negative for rash.


Neurological: Negative for headaches, weakness or numbness.





10 point ROS negative except as marked above and in HPI.





Physical Exam





- Vital signs


Vitals: 


 











Temp Pulse Resp BP Pulse Ox


 


 99.1 F   80   18   140/79 H  96 


 


 02/10/18 17:12  02/10/18 17:12  02/10/18 17:12  02/10/18 17:12  02/10/18 17:12











Interpretation: Normal


Notes: 





PHYSICAL EXAMINATION:





GENERAL: Well-appearing, well-nourished and in no acute distress.





HEAD: Atraumatic, normocephalic.





EYES: Pupils equal round and reactive to light, extraocular movements intact, 

sclera anicteric, conjunctiva are normal.





ENT: nares patent, oropharynx clear without exudates.  Moist mucous membranes.





NECK: Normal range of motion, supple without lymphadenopathy





LUNGS: Breath sounds clear to auscultation bilaterally and equal.  No wheezes 

rales or rhonchi.





HEART: Regular rate and rhythm without murmurs





ABDOMEN: Soft, nontender, normoactive bowel sounds.  No guarding, no rebound.  

No masses appreciated.





EXTREMITIES: Normal range of motion, no pitting or edema.  No cyanosis.





NEUROLOGICAL: No focal neurological deficits. Moves all extremities 

spontaneously and on command.





PSYCH: Oriented only to person





SKIN: Warm, Dry, normal turgor, no rashes or lesions noted.





Course





- Re-evaluation


Re-evalutation: 





02/10/18 18:38


Patient presents with reported complaint of abdominal pain although she denies 

any complaints to me.  Although the patient is demented, she responds very 

clearly to me that she is in no discomfort whatsoever "I'd like to get the hell 

out of here".  Patient was recently hospitalized approximately 12 days ago for 

similar presentation, found to have a 6.1 cm AAA.  Bedside ultrasound performed 

by myself demonstrates an abdominal aortic aneurysm measuring approximately 6 

cm but there is no evidence of free fluid in the abdomen or rupture.  Patient 

denies any discomfort again diagnostically excluding a ruptured AAA.  Labs are 

otherwise unremarkable with exception of the urinalysis which demonstrates 

findings consistent with the urine tract infection.  There is report of patient 

being constipated although this is not an emergent issue and I have encouraged 

the nursing facility and documentation to continue to provide MiraLAX that has 

been recommended during her prior hospitalization.  I do not see any indication 

for emergent CT imaging of the abdomen and pelvis has have a very low clinical 

suspicion for an acute left knee pathology as patient denies any complaints.  

Will discharge back to facility at this time.





- Vital Signs


Vital signs: 


 











Temp Pulse Resp BP Pulse Ox


 


 99.1 F   80   18   140/79 H  96 


 


 02/10/18 17:12  02/10/18 17:12  02/10/18 17:12  02/10/18 17:12  02/10/18 17:12














- Laboratory


Result Diagrams: 


 02/10/18 17:30





 02/10/18 17:30


Laboratory results interpreted by me: 


 











  02/10/18 02/10/18 02/10/18





  17:20 17:30 17:30


 


Hgb   11.4 L 


 


Hct   34.1 L 


 


RDW   14.5 H 


 


Creatinine    1.26 H


 


Est GFR ( Amer)    50 L


 


Est GFR (Non-Af Amer)    41 L


 


Lipase    369.6 H


 


Urine Protein  100 H  


 


Urine Urobilinogen  2.0 H  


 


Ur Leukocyte Esterase  LARGE H  














Discharge





- Discharge


Clinical Impression: 


 AAA (abdominal aortic aneurysm) without rupture





Urinary tract infection


Qualifiers:


 Urinary tract infection type: acute cystitis Hematuria presence: without 

hematuria Qualified Code(s): N30.00 - Acute cystitis without hematuria





Condition: Good


Disposition: HOME, SELF-CARE


Additional Instructions: 


Your urine shows findings consistent with a urinary tract infection.  Please 

take all the antibiotics as directed even if your symptoms have improved.  

Please follow-up with your primary care physician as needed.  Return to 

emergency room if you develop fever >101F, persistent vomiting, become lethargic

, have severe pain in your sides, or any other symptoms that are concerning to 

you.





Please continue to provide the patient 1-2 capfuls of MiraLAX daily for 

constipation.

## 2018-02-18 ENCOUNTER — HOSPITAL ENCOUNTER (INPATIENT)
Dept: HOSPITAL 62 - ER | Age: 78
LOS: 3 days | Discharge: NURSING, REHAB OR LONG-TERM CARE | DRG: 439 | End: 2018-02-21
Attending: INTERNAL MEDICINE | Admitting: INTERNAL MEDICINE
Payer: MEDICARE

## 2018-02-18 DIAGNOSIS — I25.10: ICD-10-CM

## 2018-02-18 DIAGNOSIS — F02.80: ICD-10-CM

## 2018-02-18 DIAGNOSIS — E44.0: ICD-10-CM

## 2018-02-18 DIAGNOSIS — K80.20: ICD-10-CM

## 2018-02-18 DIAGNOSIS — K85.10: Primary | ICD-10-CM

## 2018-02-18 DIAGNOSIS — G30.9: ICD-10-CM

## 2018-02-18 DIAGNOSIS — Z98.890: ICD-10-CM

## 2018-02-18 DIAGNOSIS — Z66: ICD-10-CM

## 2018-02-18 DIAGNOSIS — N13.39: ICD-10-CM

## 2018-02-18 DIAGNOSIS — I71.4: ICD-10-CM

## 2018-02-18 DIAGNOSIS — N18.2: ICD-10-CM

## 2018-02-18 DIAGNOSIS — D64.9: ICD-10-CM

## 2018-02-18 DIAGNOSIS — K21.9: ICD-10-CM

## 2018-02-18 DIAGNOSIS — E78.5: ICD-10-CM

## 2018-02-18 DIAGNOSIS — I10: ICD-10-CM

## 2018-02-18 DIAGNOSIS — K56.41: ICD-10-CM

## 2018-02-18 PROCEDURE — 85027 COMPLETE CBC AUTOMATED: CPT

## 2018-02-18 PROCEDURE — 99285 EMERGENCY DEPT VISIT HI MDM: CPT

## 2018-02-18 PROCEDURE — 70450 CT HEAD/BRAIN W/O DYE: CPT

## 2018-02-18 PROCEDURE — G0378 HOSPITAL OBSERVATION PER HR: HCPCS

## 2018-02-18 PROCEDURE — 74177 CT ABD & PELVIS W/CONTRAST: CPT

## 2018-02-18 PROCEDURE — 87040 BLOOD CULTURE FOR BACTERIA: CPT

## 2018-02-18 PROCEDURE — 80053 COMPREHEN METABOLIC PANEL: CPT

## 2018-02-18 PROCEDURE — 51702 INSERT TEMP BLADDER CATH: CPT

## 2018-02-18 PROCEDURE — 36415 COLL VENOUS BLD VENIPUNCTURE: CPT

## 2018-02-18 PROCEDURE — 81001 URINALYSIS AUTO W/SCOPE: CPT

## 2018-02-18 PROCEDURE — 83690 ASSAY OF LIPASE: CPT

## 2018-02-18 PROCEDURE — 96365 THER/PROPH/DIAG IV INF INIT: CPT

## 2018-02-18 PROCEDURE — 85025 COMPLETE CBC W/AUTO DIFF WBC: CPT

## 2018-02-18 PROCEDURE — 76705 ECHO EXAM OF ABDOMEN: CPT

## 2018-02-18 NOTE — ER DOCUMENT REPORT
ED General





- General


Stated Complaint: FALL/STOMACH PAIN


Time Seen by Provider: 02/18/18 23:57


Mode of Arrival: Stretcher


Information source: Patient


TRAVEL OUTSIDE OF THE [place] IN LAST 30 DAYS: No





- HPI


Notes: 





77-year-old lady with past medical history of Alzheimer's disease, dementia, 

aggressive behavior, prior history of pancreatitis who presented today for 

evaluation of abdominal pain.  According to staff patient complained of 

midepigastric pain and tried to get up at the facility and sustained a 

mechanical fall.  Unsure if patient had loss of consciousness.  Patient is not 

on any blood thinners.  Patient is a very poor historian and cannot provide 

details of her complaints.  Patient points to her mid abdomen for her pain.  

Patient also has history of AAA.





- Related Data


Allergies/Adverse Reactions: 


 





No Known Allergies Allergy (Verified 02/19/18 00:42)


 











Past Medical History





- Social History


Smoking Status: Unknown if Ever Smoked


Family History: Reviewed & Not Pertinent, Other - Patient suffers from dementia





- Past Medical History


Cardiac Medical History: Reports: Hx Coronary Artery Disease, Hx 

Hypercholesterolemia, Hx Hypertension


Renal/ Medical History: Denies: Hx Peritoneal Dialysis


GI Medical History: Reports: Hx Gastroesophageal Reflux Disease


Musculoskeltal Medical History: Reports Hx Arthritis


Psychiatric Medical History: Reports: Hx Dementia - Alzheimer's


Past Surgical History: Reports: Hx Abdominal Surgery





- Immunizations


Hx Diphtheria, Pertussis, Tetanus Vaccination: No





Review of Systems





- Review of Systems


-: Yes ROS unobtainable due to patient's medical condition





Physical Exam





- Vital signs


Vitals: 


 











Temp Pulse Resp BP Pulse Ox


 


 99.2 F   100   14   135/67 H  97 


 


 02/19/18 00:44  02/19/18 00:44  02/19/18 00:44  02/19/18 00:44  02/19/18 00:44














- Notes


Notes: 





Reviewed vital signs and nursing note as charted by RN. 


CONSTITUTIONAL: Alert,  patient is demented, appears to be in no acute distress


HEAD: Normocephalic; atraumatic


EYES: PERRL; Conjunctivae clear, sclerae non-icteric


ENT: normal nose; no rhinorrhea; moist dry mucous membranes; pharynx without 

lesions noted


NECK: Supple without meningismus; non-tender; no cervical lymphadenopathy, no 

masses


CARD: Regular rate and rhythm; no murmurs, no clicks, no rubs, no gallops; 

symmetric distal pulses


RESP: Normal chest excursion without splinting or tachypnea; breath sounds 

clear and equal bilaterally


ABD/GI: Normal bowel sounds; non-distended; soft, patient has epigastric 

tenderness to palpation with mild guarding


Rectal: Large amount of stool in the rectum, brown stool, heme-negative


BACK:  The back appears normal and is non-tender to palpation


EXT: Normal ROM in all joints


SKIN: Normal color for age and race; warm; dry; good turgor; capillary refill < 

2 seconds; no acute lesions noted


NEURO: Cranial nerves 3-12 intact. Motor strength 5/5 bilaterally. Sensation 

intact to touch bilaterally


PSYCH: Patient is demented








Course





- Re-evaluation


Re-evalutation: 





77-year-old here for evaluation of a fall as well as abdominal pain


Differential diagnoses includes closed head injury, subdural hematoma, 

pancreatitis, ruptured AAA, intra-abdominal infection, colitis, diverticulitis, 

peptic ulcer disease, cholecystitis, biliary colic, acute cystitis


We will obtain basic lab work including CBC, CMP, lipase, urinalysis


We will obtain CT scan of her brain as well as CT of her abdomen and pelvis 

with contrast


We will give patient IV fluids


Reassess patient





02/19/18 03:29


Patient has normal CT scan of her brain without any subdural or intracranial 

hemorrhage


CT scan of her abdomen reveals chronic AAA without any rupture measuring 

approximately 6 cm in size 


Patient has Cholelithiasis with pericholecystic fluid collection, patient has 

elevated lipase as well as alk phos and slightly elevated AST, will obtain 

right upper quadrant ultrasound to rule out acute cholecystitis or gallbladder 

pancreatitis


We will give patient IV fluids as well as IV Rocephin


Will place Glass given acute hydronephrosis and bladder distention, patient 

noted to have acute kidney injury as well likely secondary to hydronephrosis


02/19/18 04:32


Right upper quadrant ultrasound with acute on chronic cholecystitis


I have consulted surgery, spoke with Dr. Chilel, we discussed the case and 

reviewed her imaging and lab work, he recommended admission to hospitalist 

service for treatment of acute pancreatitis for now.  Patient will need 

cholecystectomy after she has been treated for acute pancreatitis.  He 

recommended admission to hospitalist.


Patient also has moderate amount of stool throughout her colon as well as rectum

, will disimpact  patient as well as give her enemas





02/19/18 04:55


I have discussed the case with hospitalist, Dr. Yaakov Hatfield, he refused 

admission


Will contact House Supervisor in order to facilitate admission process


02/19/18 05:01





02/19/18 05:32


I have discussed the case with Hospital  to resolve the admission 

process. I have told her that if Hospitalist refuses admission I will have to 

transfer this patient to another facility and that will be EMTALA violation. 

Will await recommendations and suggestions from Hospital .








02/19/18 05:49


After rectal disimpaction as well as multiple rounds of enema patient still has 

moderate amount of stool in her colon that will need a bowel regimen.  In my 

personal opinion patient needs admission for a bowel regimen as well as stool 

softeners and recurrent enemas.  I have discussed this case with hospitalist 

again, he agreed to evaluate patient in emergency department.





- Vital Signs


Vital signs: 


 











Temp Pulse Resp BP Pulse Ox


 


 99.2 F   100   14   135/67 H  97 


 


 02/19/18 00:44  02/19/18 00:44  02/19/18 00:44  02/19/18 00:44  02/19/18 00:44














- Laboratory


Result Diagrams: 


 02/19/18 00:13





 02/19/18 01:35


Laboratory results interpreted by me: 


 











  02/19/18 02/19/18 02/19/18





  00:13 01:35 04:26


 


RBC  3.23 L  


 


Hgb  9.8 L  


 


Hct  29.5 L  


 


RDW  14.6 H  


 


Band Neutrophils %  1 L  


 


BUN   24 H 


 


Creatinine   1.48 H 


 


Est GFR ( Amer)   41 L 


 


Est GFR (Non-Af Amer)   34 L 


 


Glucose   130 H 


 


AST   56 H 


 


Alkaline Phosphatase   168 H 


 


Albumin   3.3 L 


 


Lipase   409.6 H 


 


Urine Protein    30 H














- Diagnostic Test


Radiology reviewed: Image reviewed - EXAM DESCRIPTION:  CT HEAD WITHOUT   

CLINICAL HISTORY:  77 years Female, trauma   COMPARISON:  2.22.17   TECHNIQUE: 

No contrast. This exam was performed according to  our departmental dose-

optimization program, which includes  automated exposure control, adjustment of 

the mA and/or kV  according to patient size and/or use of iterative 

reconstruction  technique.   FINDINGS: No hemorrhage or infarct. No mass, mass 

effect, or  midline shift. Mild volume loss and mild white matter  

microangiopathy. Brain and extra-axial structures appear  otherwise intact.   

IMPRESSION: No acute findings.    Dictated by: TAURUS CRISTINA MD  DD: 02/19/18 

0253   CC: HUMZA WALKER MD  EXAM DESCRIPTION:  CT ABD/PELVIS WITH IV ONLY 

  CLINICAL HISTORY:  77 years Female, abdominal pain   COMPARISON:  1.27.18   

TECHNIQUE:  100 mL Isovue-370 contrast. Coronal and sagittal reformat. This  

exam was performed according to our departmental  dose-optimization program, 

which includes automated exposure  control, adjustment of the mA and/or kV 

according to patient size  and/or use of iterative reconstruction technique.   

FINDINGS:   Interval abdominal aortic aneurysm repair with aortobiiliac  graft. 

Small gas bubbles in the native abdominal aortic aneurysm  which measures 6.0 

cm in transaxial oblique diameter compared  with 6.4 cm, 1/27/2018.   Several 

calcified layered gallstones with minimal pericholecystic  fluid.   Moderate 

dilated bilateral renal collecting systems including  moderate grade bilateral 

hydronephrosis/hydroureter, right more  than left, and 17 cm dilated urinary 

bladder consistent with  prior exam, CT, 1/27/2018.   Coronary arterial stent/

calcification. Calcification associated  with the aortic valve. 5.3 cm hiatal 

hernia.  Sigmoid stool retention. Mild chronic L1 anterior compression  

deformity. Clips along the midline.  Inferior thorax, liver, pancreas, spleen, 

adrenals, renal system,  gastrointestinal tract, pelvic organs, lymphatics, and

  musculoskeleton appear otherwise unremarkable.   IMPRESSION:   1. 

Cholelithiasis with minimal nonspecific pericholecystic fluid.  Consider 

ultrasound and/or laboratory correlation, as clinically  warranted.  2. 

Aortobiiliac repair appears grossly intact on nondedicated  exam. There are 

small likely iatrogenic gas bubbles within the  native AAA decreased in overall 

diameter.  3. Moderate-grade bladder outlet obstruction pattern including  

moderate bilateral hydronephrosis/hydroureter, right more than  left. Similar 

process on prior exam, 1/27/2018. Urology  consultation advised.    Dictated by

: TAURUS CRISTINA MD  DD: 02/19/18 0317   CC: HUMZA WALKER MD





Procedures





- Additional Procedures


  ** rectal disimpaction 


Time performed: 05:00


Notes: 





02/19/18 05:07


Pt was placed in left decubitus position


Rectal examination was performed with moderate amount of soft stool in the 

rectum


The stool feels soft, brown


Large amount of stool was removed from rectum and was followed with soap jennifer 

enema and mineral oil











Discharge





- Discharge


Clinical Impression: 


 Pancreatitis, VIKAS (acute kidney injury), AAA (abdominal aortic aneurysm) 

without rupture, Dementia, DNR (do not resuscitate), Hydronephrosis due to 

obstruction of bladder, Cholecystitis, Fecal impaction of rectum, Fecal 

impaction of colon





Condition: Stable


Disposition: ADMITTED AS INPATIENT


Admitting Provider: Hospitalist


Unit Admitted: Medical Floor

## 2018-02-19 LAB
ABSOLUTE LYMPHOCYTES# (MANUAL): 1.2 10^3/UL (ref 0.5–4.7)
ABSOLUTE MONOCYTES # (MANUAL): 0.6 10^3/UL (ref 0.1–1.4)
ABSOLUTE NEUTROPHILS# (MANUAL): 4.4 10^3/UL (ref 1.7–8.2)
ADD MANUAL DIFF: YES
ALBUMIN SERPL-MCNC: 3.3 G/DL (ref 3.5–5)
ALP SERPL-CCNC: 168 U/L (ref 38–126)
ALT SERPL-CCNC: 37 U/L (ref 9–52)
ANION GAP SERPL CALC-SCNC: 11 MMOL/L (ref 5–19)
APPEARANCE UR: CLEAR
APTT PPP: YELLOW S
AST SERPL-CCNC: 56 U/L (ref 14–36)
BASOPHILS NFR BLD MANUAL: 0 % (ref 0–2)
BILIRUB DIRECT SERPL-MCNC: 0.4 MG/DL (ref 0–0.4)
BILIRUB SERPL-MCNC: 0.4 MG/DL (ref 0.2–1.3)
BILIRUB UR QL STRIP: NEGATIVE
BUN SERPL-MCNC: 24 MG/DL (ref 7–20)
CALCIUM: 8.9 MG/DL (ref 8.4–10.2)
CHLORIDE SERPL-SCNC: 105 MMOL/L (ref 98–107)
CO2 SERPL-SCNC: 25 MMOL/L (ref 22–30)
EOSINOPHIL NFR BLD MANUAL: 5 % (ref 0–6)
ERYTHROCYTE [DISTWIDTH] IN BLOOD BY AUTOMATED COUNT: 14.6 % (ref 11.5–14)
GLUCOSE SERPL-MCNC: 130 MG/DL (ref 75–110)
GLUCOSE UR STRIP-MCNC: NEGATIVE MG/DL
HCT VFR BLD CALC: 29.5 % (ref 36–47)
HGB BLD-MCNC: 9.8 G/DL (ref 12–15.5)
KETONES UR STRIP-MCNC: NEGATIVE MG/DL
LIPASE SERPL-CCNC: 409.6 U/L (ref 23–300)
MCH RBC QN AUTO: 30.3 PG (ref 27–33.4)
MCHC RBC AUTO-ENTMCNC: 33.2 G/DL (ref 32–36)
MCV RBC AUTO: 91 FL (ref 80–97)
MONOCYTES % (MANUAL): 9 % (ref 3–13)
NEUTS BAND NFR BLD MANUAL: 1 % (ref 3–5)
NITRITE UR QL STRIP: NEGATIVE
PH UR STRIP: 7 [PH] (ref 5–9)
PLATELET # BLD: 265 10^3/UL (ref 150–450)
PLATELET COMMENT: ADEQUATE
POTASSIUM SERPL-SCNC: 3.9 MMOL/L (ref 3.6–5)
PROT SERPL-MCNC: 6.8 G/DL (ref 6.3–8.2)
PROT UR STRIP-MCNC: 30 MG/DL
RBC # BLD AUTO: 3.23 10^6/UL (ref 3.72–5.28)
RBC MORPH BLD: (no result)
SEGMENTED NEUTROPHILS % (MAN): 66 % (ref 42–78)
SODIUM SERPL-SCNC: 141.4 MMOL/L (ref 137–145)
SP GR UR STRIP: 1.02
TOTAL CELLS COUNTED BLD: 100
UROBILINOGEN UR-MCNC: NEGATIVE MG/DL (ref ?–2)
VARIANT LYMPHS NFR BLD MANUAL: 18 % (ref 13–45)
WBC # BLD AUTO: 6.5 10^3/UL (ref 4–10.5)

## 2018-02-19 RX ADMIN — HEPARIN SODIUM SCH UNIT: 5000 INJECTION, SOLUTION INTRAVENOUS; SUBCUTANEOUS at 14:19

## 2018-02-19 RX ADMIN — SENNOSIDES, DOCUSATE SODIUM SCH EACH: 50; 8.6 TABLET, FILM COATED ORAL at 22:32

## 2018-02-19 RX ADMIN — HEPARIN SODIUM SCH UNIT: 5000 INJECTION, SOLUTION INTRAVENOUS; SUBCUTANEOUS at 22:30

## 2018-02-19 RX ADMIN — QUETIAPINE FUMARATE SCH MG: 25 TABLET, FILM COATED ORAL at 22:31

## 2018-02-19 RX ADMIN — SODIUM CHLORIDE PRN ML: 9 INJECTION, SOLUTION INTRAVENOUS at 13:01

## 2018-02-19 RX ADMIN — LACTULOSE SCH GM: 20 SOLUTION ORAL at 11:32

## 2018-02-19 RX ADMIN — HALOPERIDOL LACTATE PRN MG: 5 INJECTION, SOLUTION INTRAMUSCULAR at 15:46

## 2018-02-19 RX ADMIN — DOCUSATE SODIUM SCH MG: 100 CAPSULE, LIQUID FILLED ORAL at 18:28

## 2018-02-19 NOTE — PDOC CONSULTATION
Consultation


Consult Date: 02/19/18


Consult reason:: gallstone pancreatitis





History of Present Illness


History of Present Illness: 


YOKO ACEVEDO is a 77 year old female, group home resident, with a c/o 

midabdomianl pain.  She has moderately Alzheimer's disease, cooperative, but 

poor historian.   A CT scan A/P has been done and it is significant for 

cholelithiasis and pericholecystic fliuid, grafted AAA, fecal matter retained, 

and distended bladder with secondary hydronephrosis left more than right. A GB 

US confirms cholelithiasis with pericholecystic fluid.  Her blood work reveals 

elevated lipase and slight elevation of liver enzymes with normal bilirubin.








Past Medical History


Cardiac Medical History: Reports: Coronary Artery Disease, Hyperlipidema, 

Hypertension


GI Medical History: Reports: Gastroesophageal Reflux Disease


Musculoskeltal Medical History: Reports: Arthritis


Psychiatric Medical History: Reports: Dementia - Alzheimer's





Past Surgical History


Past Surgical History: Reports: Vascular Surgery - AAA biforcated stent 

placement





Social History


Smoking Status: Unknown if Ever Smoked


Frequency of Alcohol Use: None


Hx Recreational Drug Use: No


Drugs: None


Hx Prescription Drug Abuse: No





Family History


Family History: Reviewed & Not Pertinent, Other - Patient suffers from dementia


Parental Family History Reviewed: Yes


Children Family History Reviewed: Yes


Sibling(s) Family History Reviewed.: Yes





Medication/Allergy


Allergies/Adverse Reactions: 


 





No Known Allergies Allergy (Verified 02/19/18 00:42)


 











Physical Exam


Vital Signs: 


 











Temp Pulse Resp BP Pulse Ox


 


 99.2 F   100   14   135/67 H  97 


 


 02/19/18 00:44  02/19/18 00:44  02/19/18 00:44  02/19/18 00:44  02/19/18 00:44











General appearance: PRESENT: no acute distress, cooperative


Head exam: PRESENT: atraumatic


Mouth exam: PRESENT: dry mucosa


Neck exam: PRESENT: full ROM


Respiratory exam: PRESENT: clear to auscultation [place]


Cardiovascular exam: PRESENT: RRR


GI/Abdominal exam: PRESENT: hypoactive bowel sounds, soft, tenderness - upper 

abdomen, other - epigastric abdominal transverse incision. well healed


Rectal exam: PRESENT: deferred


Musculoskeletal exam: PRESENT: full ROM





Results


Laboratory Results: 


 





 02/19/18 00:13 





 02/19/18 01:35 





 











  02/19/18 02/19/18 02/19/18





  00:13 00:13 01:35


 


WBC  6.5  


 


RBC  3.23 L  


 


Hgb  9.8 L  


 


Hct  29.5 L  


 


MCV  91  


 


MCH  30.3  


 


MCHC  33.2  


 


RDW  14.6 H  


 


Plt Count  265  


 


Seg Neutrophils %  Not Reportable  


 


Lymphocytes %  Not Reportable  


 


Monocytes %  Not Reportable  


 


Eosinophils %  Not Reportable  


 


Basophils %  Not Reportable  


 


Absolute Neutrophils  Not Reportable  


 


Absolute Lymphocytes  Not Reportable  


 


Absolute Monocytes  Not Reportable  


 


Absolute Eosinophils  Not Reportable  


 


Absolute Basophils  Not Reportable  


 


Sodium   Cancelled  141.4


 


Potassium   Cancelled  3.9


 


Chloride   Cancelled  105


 


Carbon Dioxide   Cancelled  25


 


Anion Gap   Cancelled  11


 


BUN   Cancelled  24 H


 


Creatinine   Cancelled  1.48 H


 


Est GFR ( Amer)   Cancelled  41 L


 


Est GFR (Non-Af Amer)   Cancelled  34 L


 


Glucose   Cancelled  130 H


 


Calcium   Cancelled  8.9


 


Total Bilirubin   Cancelled  0.4


 


AST   Cancelled  56 H


 


ALT   Cancelled  37


 


Alkaline Phosphatase   Cancelled  168 H


 


Total Protein   Cancelled  6.8


 


Albumin   Cancelled  3.3 L


 


Lipase   Cancelled  409.6 H


 


Urine Color   


 


Urine Appearance   


 


Urine pH   


 


Ur Specific Gravity   


 


Urine Protein   


 


Urine Glucose (UA)   


 


Urine Ketones   


 


Urine Blood   


 


Urine Nitrite   


 


Ur Leukocyte Esterase   


 


Urine WBC (Auto)   


 


Urine RBC (Auto)   














  02/19/18





  04:26


 


WBC 


 


RBC 


 


Hgb 


 


Hct 


 


MCV 


 


MCH 


 


MCHC 


 


RDW 


 


Plt Count 


 


Seg Neutrophils % 


 


Lymphocytes % 


 


Monocytes % 


 


Eosinophils % 


 


Basophils % 


 


Absolute Neutrophils 


 


Absolute Lymphocytes 


 


Absolute Monocytes 


 


Absolute Eosinophils 


 


Absolute Basophils 


 


Sodium 


 


Potassium 


 


Chloride 


 


Carbon Dioxide 


 


Anion Gap 


 


BUN 


 


Creatinine 


 


Est GFR ( Amer) 


 


Est GFR (Non-Af Amer) 


 


Glucose 


 


Calcium 


 


Total Bilirubin 


 


AST 


 


ALT 


 


Alkaline Phosphatase 


 


Total Protein 


 


Albumin 


 


Lipase 


 


Urine Color  YELLOW


 


Urine Appearance  CLEAR


 


Urine pH  7.0


 


Ur Specific Gravity  1.018


 


Urine Protein  30 H


 


Urine Glucose (UA)  NEGATIVE


 


Urine Ketones  NEGATIVE


 


Urine Blood  NEGATIVE


 


Urine Nitrite  NEGATIVE


 


Ur Leukocyte Esterase  NEGATIVE


 


Urine WBC (Auto)  4


 


Urine RBC (Auto)  0











Impressions: 


 





Abdomen/Pelvis CT  02/18/18 23:58


IMPRESSION:


 


1. Cholelithiasis with minimal nonspecific pericholecystic fluid.


Consider ultrasound and/or laboratory correlation, as clinically


warranted.


2. Aortobiiliac repair appears grossly intact on nondedicated


exam. There are small likely iatrogenic gas bubbles within the


native AAA decreased in overall diameter.


3. Moderate-grade bladder outlet obstruction pattern including


moderate bilateral hydronephrosis/hydroureter, right more than


left. Similar process on prior exam, 1/27/2018. Urology


consultation advised.


 








Head CT  02/18/18 23:58


IMPRESSION: No acute findings.


 








Abdomen Ultrasound  02/19/18 03:24


IMPRESSION:


 


Cholelithiasis with positive sonographic Saunders's test.


Differential diagnosis includes acute/chronic cholecystitis and


biliary colic.


 














Assessment & Plan





- Diagnosis


(1) Acute gallstone pancreatitis


Is this a current diagnosis for this admission?: Yes   





- Plan Summary


Plan Summary: 





A/





78 y/o F, with dementia


Gallstone pancreatitis.elevated lipase (406)


Cholelithiaiss with some pericholecystic fluid and slight elevation of liver 

enzymes, normal bilirubin


Distended bladder with hydronephrosis, unknown cause


Fecal matter retention


Biforcated AAA stent placed





P/





Patient to be admitted by hospitalist


NPO


IVF hydration


No antibiotics needed


Mineral oil enemas to relieve fecal matter retention


Once her medical condition has stabilized and her abdominal pain improves, 

patient will be scheduled to undergo laparoscopic cholecystectomy

## 2018-02-19 NOTE — PDOC H&P
History of Present Illness


History of Present Illness: 


YOKO ACEVEDO is a 77 year old female nursing home resident with a history of 

Alzheimer's disease oriented to self but socially appropriate.  History 

includes 6 cm abdominal aortic aneurysm, chronic bilateral hydronephrosis, 

stage II chronic kidney disease, cholelithiasis, and recurrent chronic 

constipation.  Patient presents with left lower quadrant abdominal pain, CT 

abdomen and pelvis reveals chronicity and stability of the aortic aneurysm, 

hydronephrosis and cholelithiasis.  It also shows marketed fecal impaction.  In 

the emergency room she receives manual disimpaction is referred to the 

hospitalist for observation.  Patient denies pain currently and requests 

something to eat.








Past Medical History


Cardiac Medical History: Reports: Coronary Artery Disease, Hyperlipidema, 

Hypertension


GI Medical History: Reports: Gastroesophageal Reflux Disease


Musculoskeltal Medical History: Reports: Arthritis


Psychiatric Medical History: Reports: Dementia - Alzheimer's





Past Surgical History


Past Surgical History: Reports: Vascular Surgery - AAA biforcated stent 

placement





Social History


Information Source: formerly [facility] Records


Lives with: Nursing Home


Smoking Status: Unknown if Ever Smoked


Frequency of Alcohol Use: None


Hx Recreational Drug Use: No


Drugs: None


Hx Prescription Drug Abuse: No





- Advance Directive


Resuscitation Status: Do Not Resuscitate





Family History


Family History: Other - Patient suffers from dementia


Parental Family History Reviewed: Yes


Children Family History Reviewed: Yes


Sibling(s) Family History Reviewed.: Yes





Medication/Allergy


Allergies/Adverse Reactions: 


 





No Known Allergies Allergy (Verified 02/19/18 00:42)


 











Review of Systems


ROS unobtainable: Due to mental status - Dementia





Physical Exam


Vital Signs: 


 











Temp Pulse Resp BP Pulse Ox


 


 97.9 F   82   16   129/75 H  98 


 


 02/19/18 04:00  02/19/18 04:00  02/19/18 04:00  02/19/18 04:00  02/19/18 04:00








 Intake & Output











 02/17/18 02/18/18 02/19/18





 11:59 11:59 11:59


 


Weight   58.967 kg











General appearance: PRESENT: no acute distress, cooperative


Head exam: PRESENT: atraumatic, normocephalic


Eye exam: PRESENT: conjunctiva pink, EOMI, PERRLA.  ABSENT: scleral icterus


Ear exam: PRESENT: normal external ear exam


Mouth exam: PRESENT: moist, tongue midline


Neck exam: ABSENT: carotid bruit, JVD, lymphadenopathy, thyromegaly


Respiratory exam: PRESENT: clear to auscultation [place].  ABSENT: rales, rhonchi, 

wheezes


Cardiovascular exam: PRESENT: RRR.  ABSENT: diastolic murmur, rubs, systolic 

murmur


Pulses: PRESENT: normal dorsalis pedis pul


Vascular exam: PRESENT: normal capillary refill


GI/Abdominal exam: PRESENT: diminished bowel sounds, hypoactive bowel sounds, 

soft.  ABSENT: distended, firm, guarding, normal bowel sounds, organolmegaly, 

rebound


Rectal exam: PRESENT: deferred


Extremities exam: PRESENT: full ROM.  ABSENT: calf tenderness, clubbing, pedal 

edema


Neurological exam: PRESENT: alert, awake, oriented to person, oriented to place

, oriented to time, oriented to situation, CN II-XII grossly intact.  ABSENT: 

motor sensory deficit


Psychiatric exam: PRESENT: appropriate affect, normal mood.  ABSENT: homicidal 

ideation, suicidal ideation


Skin exam: PRESENT: dry, intact, warm.  ABSENT: cyanosis, rash





Results


Laboratory Results: 


 





 02/19/18 00:13 





 02/19/18 01:35 





 











  02/19/18 02/19/18 02/19/18





  00:13 00:13 01:35


 


WBC  6.5  


 


RBC  3.23 L  


 


Hgb  9.8 L  


 


Hct  29.5 L  


 


MCV  91  


 


MCH  30.3  


 


MCHC  33.2  


 


RDW  14.6 H  


 


Plt Count  265  


 


Seg Neutrophils %  Not Reportable  


 


Lymphocytes %  Not Reportable  


 


Monocytes %  Not Reportable  


 


Eosinophils %  Not Reportable  


 


Basophils %  Not Reportable  


 


Absolute Neutrophils  Not Reportable  


 


Absolute Lymphocytes  Not Reportable  


 


Absolute Monocytes  Not Reportable  


 


Absolute Eosinophils  Not Reportable  


 


Absolute Basophils  Not Reportable  


 


Sodium   Cancelled  141.4


 


Potassium   Cancelled  3.9


 


Chloride   Cancelled  105


 


Carbon Dioxide   Cancelled  25


 


Anion Gap   Cancelled  11


 


BUN   Cancelled  24 H


 


Creatinine   Cancelled  1.48 H


 


Est GFR ( Amer)   Cancelled  41 L


 


Est GFR (Non-Af Amer)   Cancelled  34 L


 


Glucose   Cancelled  130 H


 


Calcium   Cancelled  8.9


 


Total Bilirubin   Cancelled  0.4


 


AST   Cancelled  56 H


 


ALT   Cancelled  37


 


Alkaline Phosphatase   Cancelled  168 H


 


Total Protein   Cancelled  6.8


 


Albumin   Cancelled  3.3 L


 


Lipase   Cancelled  409.6 H


 


Urine Color   


 


Urine Appearance   


 


Urine pH   


 


Ur Specific Gravity   


 


Urine Protein   


 


Urine Glucose (UA)   


 


Urine Ketones   


 


Urine Blood   


 


Urine Nitrite   


 


Ur Leukocyte Esterase   


 


Urine WBC (Auto)   


 


Urine RBC (Auto)   














  02/19/18





  04:26


 


WBC 


 


RBC 


 


Hgb 


 


Hct 


 


MCV 


 


MCH 


 


MCHC 


 


RDW 


 


Plt Count 


 


Seg Neutrophils % 


 


Lymphocytes % 


 


Monocytes % 


 


Eosinophils % 


 


Basophils % 


 


Absolute Neutrophils 


 


Absolute Lymphocytes 


 


Absolute Monocytes 


 


Absolute Eosinophils 


 


Absolute Basophils 


 


Sodium 


 


Potassium 


 


Chloride 


 


Carbon Dioxide 


 


Anion Gap 


 


BUN 


 


Creatinine 


 


Est GFR ( Amer) 


 


Est GFR (Non-Af Amer) 


 


Glucose 


 


Calcium 


 


Total Bilirubin 


 


AST 


 


ALT 


 


Alkaline Phosphatase 


 


Total Protein 


 


Albumin 


 


Lipase 


 


Urine Color  YELLOW


 


Urine Appearance  CLEAR


 


Urine pH  7.0


 


Ur Specific Gravity  1.018


 


Urine Protein  30 H


 


Urine Glucose (UA)  NEGATIVE


 


Urine Ketones  NEGATIVE


 


Urine Blood  NEGATIVE


 


Urine Nitrite  NEGATIVE


 


Ur Leukocyte Esterase  NEGATIVE


 


Urine WBC (Auto)  4


 


Urine RBC (Auto)  0











Impressions: 


 





Abdomen/Pelvis CT  02/18/18 23:58


IMPRESSION:


 


1. Cholelithiasis with minimal nonspecific pericholecystic fluid.


Consider ultrasound and/or laboratory correlation, as clinically


warranted.


2. Aortobiiliac repair appears grossly intact on nondedicated


exam. There are small likely iatrogenic gas bubbles within the


native AAA decreased in overall diameter.


3. Moderate-grade bladder outlet obstruction pattern including


moderate bilateral hydronephrosis/hydroureter, right more than


left. Similar process on prior exam, 1/27/2018. Urology


consultation advised.


 








Head CT  02/18/18 23:58


IMPRESSION: No acute findings.


 








Abdomen Ultrasound  02/19/18 03:24


IMPRESSION:


 


Cholelithiasis with positive sonographic Saunders's test.


Differential diagnosis includes acute/chronic cholecystitis and


biliary colic.


 














Assessment & Plan





- Diagnosis


(1) Fecal impaction


Is this a current diagnosis for this admission?: Yes   


Plan: 


Manual disimpaction initiated with moderate results, continue bowel regiment 

with lactulose, Colace and daily enema 3 days.  Trial clear liquid diet and 

advance as tolerated.








(2) Chronic renal failure


Is this a current diagnosis for this admission?: Yes   


Plan: 


In review of recent labs she is at baseline.  Avoid nephrotoxic meds and doses 

gentle IV fluid challenge.








(3) AAA (abdominal aortic aneurysm) without rupture


Is this a current diagnosis for this admission?: Yes   


Plan: 


Unchanged from previous imaging.








(4) Cholelithiasis


Is this a current diagnosis for this admission?: Yes   


Plan: 


No sign of acute illness, without right upper quadrant pain, LFT elevation, 

bilirubin or, leukocytosis, ductal dilatation or fever.








- Time


Time Spent: 30 to 50 Minutes

## 2018-02-19 NOTE — PHYSICIAN ADVISORY NOTE
Physician Advisor ProgressNote


.: 


Pursuant to the  plan for [facility], I have reviewed the medical record 

for this patient.  





Physician Advisor Statement: 











Status:  appropriately brought in as Obs for fecal impaction/acute pancreatitis 

if attending expecting possibility this Medicare pt may improve enough for d/c 

to be safe later today.  


She did start medical tx in ED prior to MN, so has 1MN already in hospital 

care.  


If she does not improve sufficiently quickly for d/c to be felt safe later today

, please document ongoing clinical issues requiring hospitalization for 2nd MN (

tonight), & may then change to Inpatient status based on current Medicare 

guidelines.





Thanks!


CK

## 2018-02-19 NOTE — RADIOLOGY REPORT (SQ)
EXAM DESCRIPTION:

CT HEAD WITHOUT



CLINICAL HISTORY:

77 years Female, trauma



COMPARISON:

2.22.17



TECHNIQUE:  No contrast.  This exam was performed according to

our departmental dose-optimization program, which includes

automated exposure control, adjustment of the mA and/or kV

according to patient size and/or use of iterative reconstruction

technique.



FINDINGS:  No hemorrhage or infarct. No mass, mass effect, or

midline shift. Mild volume loss and mild white matter

microangiopathy. Brain and extra-axial structures appear

otherwise intact.



IMPRESSION: No acute findings.

## 2018-02-19 NOTE — RADIOLOGY REPORT (SQ)
EXAM DESCRIPTION:

CT ABD/PELVIS WITH IV ONLY



CLINICAL HISTORY:

77 years Female, abdominal pain



COMPARISON:

1.27.18



TECHNIQUE:

100 mL Isovue-370 contrast.  Coronal and sagittal reformat.  This

exam was performed according to our departmental

dose-optimization program, which includes automated exposure

control, adjustment of the mA and/or kV according to patient size

and/or use of iterative reconstruction technique.



FINDINGS:



Interval abdominal aortic aneurysm repair with aortobiiliac

graft. Small gas bubbles in the native abdominal aortic aneurysm

which measures 6.0 cm in transaxial oblique diameter compared

with 6.4 cm, 1/27/2018.



Several calcified layered gallstones with minimal pericholecystic

fluid. 



Moderate dilated bilateral renal collecting systems including

moderate grade bilateral hydronephrosis/hydroureter, right more

than left, and 17 cm dilated urinary bladder consistent with

prior exam, CT, 1/27/2018.



Coronary arterial stent/calcification. Calcification associated

with the aortic valve. 5.3 cm hiatal hernia.

Sigmoid stool retention. Mild chronic L1 anterior compression

deformity. Clips along the midline.

Inferior thorax, liver, pancreas, spleen, adrenals, renal system,

gastrointestinal tract, pelvic organs, lymphatics, and

musculoskeleton appear otherwise unremarkable.   



IMPRESSION:



1. Cholelithiasis with minimal nonspecific pericholecystic fluid.

Consider ultrasound and/or laboratory correlation, as clinically

warranted.

2. Aortobiiliac repair appears grossly intact on nondedicated

exam. There are small likely iatrogenic gas bubbles within the

native AAA decreased in overall diameter.

3. Moderate-grade bladder outlet obstruction pattern including

moderate bilateral hydronephrosis/hydroureter, right more than

left. Similar process on prior exam, 1/27/2018. Urology

consultation advised.

## 2018-02-19 NOTE — RADIOLOGY REPORT (SQ)
EXAM DESCRIPTION: 



U/S ABDOMEN LIMITED W/O DOP



CLINICAL HISTORY: 



77 years, Female, acute cholecystitis



COMPARISON:

None.



TECHNIQUE/limitation: Targeted gallbladder only as requested.

Uncooperative patient.



FINDINGS:



Several layering gallstones measuring up to 1.2 cm each,

partially contracted and gallbladder, minimal pericholecystic

fluid, and positive sonographic Saunders's test. Gallbladder wall

thickness is 0.2 cm. Common bile duct measures 0.4 cm in

diameter.



IMPRESSION:



Cholelithiasis with positive sonographic Saunders's test.

Differential diagnosis includes acute/chronic cholecystitis and

biliary colic.

## 2018-02-19 NOTE — PDOC PROGRESS REPORT
Subjective


Progress Note for:: 02/19/18


Subjective:: 





Patient is seen on rounds.  She is resting in bed.  She is incontinent of 

stool.  She states her lower abdominal pain is still present.  She is confused 

to time place and person.  There is presently no family at the bedside.  She 

denies any nausea or vomiting.  She denies any chest pain, shortness of breath 

or dyspnea.  She denies any arthralgias or myalgias.


Reason For Visit: 


ABD PAIN,CKD, DEMENTIA, FECAL IMPACTION








Physical Exam


Vital Signs: 


 











Temp Pulse Resp BP Pulse Ox


 


 99.2 F   95   16   140/68 H  100 


 


 02/19/18 16:42  02/19/18 17:19  02/19/18 17:19  02/19/18 16:42  02/19/18 16:42








 Intake & Output











 02/18/18 02/19/18 02/20/18





 06:59 06:59 06:59


 


Output Total   1950


 


Balance   -1950











General appearance: PRESENT: no acute distress, disheveled, hard of hearing, 

thin, well-developed


Head exam: PRESENT: atraumatic, normocephalic


Eye exam: PRESENT: conjunctiva pale


Ear exam: PRESENT: normal external ear exam


Mouth exam: PRESENT: moist, neck supple, tongue midline


Teeth exam: PRESENT: edentulous


Neck exam: PRESENT: carotid bruit, full ROM


Respiratory exam: PRESENT: clear to auscultation [place].  ABSENT: rales, rhonchi, 

wheezes


Cardiovascular exam: PRESENT: RRR.  ABSENT: diastolic murmur, rubs, systolic 

murmur


Pulses: PRESENT: normal carotid pulses, normal radial pulses


Vascular exam: PRESENT: normal capillary refill


GI/Abdominal exam: PRESENT: hypoactive bowel sounds, soft, tenderness - 

Epigastric area,


Rectal exam: PRESENT: deferred


Extremities exam: PRESENT: full ROM.  ABSENT: calf tenderness, clubbing, pedal 

edema


Musculoskeletal exam: PRESENT: ambulatory, full ROM, normal inspection


Neurological exam: PRESENT: alert, altered, CN II-XII grossly intact


Psychiatric exam: PRESENT: anxious, flat affect


Skin exam: PRESENT: dry, intact, warm.  ABSENT: cyanosis, rash





Results


Impressions: 


 





Abdomen/Pelvis CT  02/18/18 23:58


IMPRESSION:


 


1. Cholelithiasis with minimal nonspecific pericholecystic fluid.


Consider ultrasound and/or laboratory correlation, as clinically


warranted.


2. Aortobiiliac repair appears grossly intact on nondedicated


exam. There are small likely iatrogenic gas bubbles within the


native AAA decreased in overall diameter.


3. Moderate-grade bladder outlet obstruction pattern including


moderate bilateral hydronephrosis/hydroureter, right more than


left. Similar process on prior exam, 1/27/2018. Urology


consultation advised.


 








Head CT  02/18/18 23:58


IMPRESSION: No acute findings.


 








Abdomen Ultrasound  02/19/18 03:24


IMPRESSION:


 


Cholelithiasis with positive sonographic Saunders's test.


Differential diagnosis includes acute/chronic cholecystitis and


biliary colic.


 














Assessment & Plan





- Diagnosis


(1) Acute gallstone pancreatitis


Is this a current diagnosis for this admission?: Yes   


Plan: 


Patient still having some epigastric tenderness.  Was given IV hydration in the 

ER.  She is not having any nausea at the present time.  She is drinking without 

vomiting.  Surgery is following for possible cholecystectomy laparoscopically 

once her other symptoms are stable.  We will recheck her lipase in the morning 

she still having low-grade fevers as well 








(2) Cholelithiasis


Qualifiers: 


   Cholelithiasis location: gallbladder 


Is this a current diagnosis for this admission?: Yes   


Plan: 


Surgery is following.  Possible laparoscopic cholecystectomy once her other 

symptoms are stable








(3) Chronic renal failure


Qualifiers: 


   Chronic kidney disease stage: stage 3 (moderate)   Qualified Code(s): N18.3 

- Chronic kidney disease, stage 3 (moderate)   


Is this a current diagnosis for this admission?: Yes   


Plan: 


We will avoid nephrotoxic medications and dosages








(4) Dementia


Qualifiers: 


 


Is this a current diagnosis for this admission?: Yes   


Plan: 


She lives in an extended care facility








(5) Fecal impaction in rectum


Is this a current diagnosis for this admission?: Yes   


Plan: 


She was disimpacted, and cathartics per








(6) AAA (abdominal aortic aneurysm)


Qualifiers: 


   Presence of rupture: without rupture   Qualified Code(s): I71.4 - Abdominal 

aortic aneurysm, without rupture   


Is this a current diagnosis for this admission?: Yes   


Plan: 


Known aneurysm approximately 6 cm in size








(7) Moderate protein-calorie malnutrition


Is this a current diagnosis for this admission?: Yes   


Plan: 


We will advance diet once her symptoms improve








- Time


Time Spent with patient: 25-34 minutes


Total Critical Time (Minutes): 15

## 2018-02-20 LAB
ADD MANUAL DIFF: NO
ALBUMIN SERPL-MCNC: 2.6 G/DL (ref 3.5–5)
ALP SERPL-CCNC: 180 U/L (ref 38–126)
ALT SERPL-CCNC: 40 U/L (ref 9–52)
ANION GAP SERPL CALC-SCNC: 8 MMOL/L (ref 5–19)
AST SERPL-CCNC: 75 U/L (ref 14–36)
BASOPHILS # BLD AUTO: 0.1 10^3/UL (ref 0–0.2)
BASOPHILS NFR BLD AUTO: 1.4 % (ref 0–2)
BILIRUB DIRECT SERPL-MCNC: 0.2 MG/DL (ref 0–0.4)
BILIRUB SERPL-MCNC: 0.3 MG/DL (ref 0.2–1.3)
BUN SERPL-MCNC: 14 MG/DL (ref 7–20)
CALCIUM: 8 MG/DL (ref 8.4–10.2)
CHLORIDE SERPL-SCNC: 111 MMOL/L (ref 98–107)
CO2 SERPL-SCNC: 25 MMOL/L (ref 22–30)
EOSINOPHIL # BLD AUTO: 0.2 10^3/UL (ref 0–0.6)
EOSINOPHIL NFR BLD AUTO: 2.9 % (ref 0–6)
ERYTHROCYTE [DISTWIDTH] IN BLOOD BY AUTOMATED COUNT: 14.1 % (ref 11.5–14)
ERYTHROCYTE [DISTWIDTH] IN BLOOD BY AUTOMATED COUNT: 14.3 % (ref 11.5–14)
GLUCOSE SERPL-MCNC: 101 MG/DL (ref 75–110)
HCT VFR BLD CALC: 22.4 % (ref 36–47)
HCT VFR BLD CALC: 23.6 % (ref 36–47)
HGB BLD-MCNC: 7.6 G/DL (ref 12–15.5)
HGB BLD-MCNC: 7.9 G/DL (ref 12–15.5)
LIPASE SERPL-CCNC: 217.1 U/L (ref 23–300)
LYMPHOCYTES # BLD AUTO: 1.5 10^3/UL (ref 0.5–4.7)
LYMPHOCYTES NFR BLD AUTO: 21.4 % (ref 13–45)
MCH RBC QN AUTO: 30.7 PG (ref 27–33.4)
MCH RBC QN AUTO: 30.8 PG (ref 27–33.4)
MCHC RBC AUTO-ENTMCNC: 33.6 G/DL (ref 32–36)
MCHC RBC AUTO-ENTMCNC: 34.1 G/DL (ref 32–36)
MCV RBC AUTO: 90 FL (ref 80–97)
MCV RBC AUTO: 92 FL (ref 80–97)
MONOCYTES # BLD AUTO: 0.7 10^3/UL (ref 0.1–1.4)
MONOCYTES NFR BLD AUTO: 9.9 % (ref 3–13)
NEUTROPHILS # BLD AUTO: 4.4 10^3/UL (ref 1.7–8.2)
NEUTS SEG NFR BLD AUTO: 64.4 % (ref 42–78)
PLATELET # BLD: 258 10^3/UL (ref 150–450)
PLATELET # BLD: 277 10^3/UL (ref 150–450)
POTASSIUM SERPL-SCNC: 4 MMOL/L (ref 3.6–5)
PROT SERPL-MCNC: 5.3 G/DL (ref 6.3–8.2)
RBC # BLD AUTO: 2.49 10^6/UL (ref 3.72–5.28)
RBC # BLD AUTO: 2.57 10^6/UL (ref 3.72–5.28)
SODIUM SERPL-SCNC: 144.4 MMOL/L (ref 137–145)
TOTAL CELLS COUNTED % (AUTO): 100 %
WBC # BLD AUTO: 6.9 10^3/UL (ref 4–10.5)
WBC # BLD AUTO: 7.5 10^3/UL (ref 4–10.5)

## 2018-02-20 RX ADMIN — HEPARIN SODIUM SCH UNIT: 5000 INJECTION, SOLUTION INTRAVENOUS; SUBCUTANEOUS at 06:45

## 2018-02-20 RX ADMIN — QUETIAPINE FUMARATE SCH MG: 25 TABLET, FILM COATED ORAL at 22:13

## 2018-02-20 RX ADMIN — LANSOPRAZOLE SCH MG: 30 TABLET, ORALLY DISINTEGRATING, DELAYED RELEASE ORAL at 10:53

## 2018-02-20 RX ADMIN — SENNOSIDES, DOCUSATE SODIUM SCH EACH: 50; 8.6 TABLET, FILM COATED ORAL at 22:14

## 2018-02-20 RX ADMIN — FLUTICASONE PROPIONATE SCH SPRAY: 50 SPRAY, METERED NASAL at 10:54

## 2018-02-20 RX ADMIN — METOPROLOL SUCCINATE SCH MG: 25 TABLET, EXTENDED RELEASE ORAL at 10:54

## 2018-02-20 RX ADMIN — SODIUM CHLORIDE PRN ML: 9 INJECTION, SOLUTION INTRAVENOUS at 06:29

## 2018-02-20 RX ADMIN — CYANOCOBALAMIN TAB 1000 MCG SCH MCG: 1000 TAB at 10:54

## 2018-02-20 RX ADMIN — DOCUSATE SODIUM SCH MG: 100 CAPSULE, LIQUID FILLED ORAL at 18:02

## 2018-02-20 RX ADMIN — Medication SCH MG: at 10:54

## 2018-02-20 RX ADMIN — POLYETHYLENE GLYCOL 3350 SCH GM: 17 POWDER, FOR SOLUTION ORAL at 10:53

## 2018-02-20 RX ADMIN — ASPIRIN SCH MG: 81 TABLET, COATED ORAL at 10:53

## 2018-02-20 RX ADMIN — HEPARIN SODIUM SCH UNIT: 5000 INJECTION, SOLUTION INTRAVENOUS; SUBCUTANEOUS at 13:23

## 2018-02-20 RX ADMIN — HEPARIN SODIUM SCH UNIT: 5000 INJECTION, SOLUTION INTRAVENOUS; SUBCUTANEOUS at 22:13

## 2018-02-20 RX ADMIN — LACTULOSE SCH GM: 20 SOLUTION ORAL at 10:55

## 2018-02-20 RX ADMIN — HALOPERIDOL LACTATE PRN MG: 5 INJECTION, SOLUTION INTRAMUSCULAR at 01:52

## 2018-02-20 RX ADMIN — DOCUSATE SODIUM SCH MG: 100 CAPSULE, LIQUID FILLED ORAL at 10:54

## 2018-02-20 NOTE — PHYSICIAN ADVISORY NOTE
Physician Advisor ProgressNote


.: 


Pursuant to the  plan for [facility], I have reviewed the medical record 

for this patient.  





Physician Advisor Statement: 





Status:


Reasons for 2nd MN nicely documented:  cont'd abd pain, epigastric tenderness, 

low grade fevers, plan for possible lap ozzie.  


Now with precipitous drop in H/H (?acute blood loss anemia, or just from 

rehydration?), resolved VIKAS but sl worsened LFTs. 


Appropriate for Inpatient status as of 2/19 PM.





CK

## 2018-02-20 NOTE — PDOC PROGRESS REPORT
Subjective


Progress Note for:: 02/20/18


Subjective:: 


No overnight events. Continues to endorse abdominal pain but feels it is 

better. Denies fevers, chills, CP, SOB, N/V/D. Denies hematuria, hematochezia, 

or other bleeding issues, in context of dropping H&H


Reason For Visit: 


ABD PAIN,CKD, DEMENTIA, FECAL IMPACTION








Physical Exam


Vital Signs: 


 











Temp Pulse Resp BP Pulse Ox


 


 98.6 F   88   16   131/72 H  95 


 


 02/20/18 03:53  02/20/18 03:53  02/20/18 03:53  02/20/18 03:53  02/20/18 03:53








 Intake & Output











 02/19/18 02/20/18 02/21/18





 06:59 06:59 06:59


 


Intake Total  2321 


 


Output Total  2630 


 


Balance  -309 











General appearance: PRESENT: no acute distress, well-developed, well-nourished


Head exam: PRESENT: atraumatic, normocephalic


Mouth exam: PRESENT: moist


Respiratory exam: PRESENT: symmetrical


Cardiovascular exam: PRESENT: RRR, +S1, +S2.  ABSENT: systolic murmur


GI/Abdominal exam: PRESENT: tenderness - Epigastric, RUQ.  ABSENT: distended


Extremities exam: PRESENT: tenderness


Neurological exam: PRESENT: altered, oriented to person.  ABSENT: oriented to 

place, oriented to time, aphasic





Results


Laboratory Results: 


 





 02/20/18 06:41 





 02/20/18 06:41 





 











  02/20/18 02/20/18





  06:41 06:41


 


WBC   6.9


 


RBC   2.49 L


 


Hgb   7.6 L D


 


Hct   22.4 L


 


MCV   90


 


MCH   30.7


 


MCHC   34.1


 


RDW   14.1 H


 


Plt Count   258


 


Seg Neutrophils %   64.4


 


Lymphocytes %   21.4


 


Monocytes %   9.9


 


Eosinophils %   2.9


 


Basophils %   1.4


 


Absolute Neutrophils   4.4


 


Absolute Lymphocytes   1.5


 


Absolute Monocytes   0.7


 


Absolute Eosinophils   0.2


 


Absolute Basophils   0.1


 


Sodium  144.4 


 


Potassium  4.0 


 


Chloride  111 H 


 


Carbon Dioxide  25 


 


Anion Gap  8 


 


BUN  14 


 


Creatinine  1.00 


 


Est GFR ( Amer)  > 60 


 


Est GFR (Non-Af Amer)  54 L 


 


Glucose  101 


 


Calcium  8.0 L 


 


Total Bilirubin  0.3 


 


AST  75 H 


 


ALT  40 


 


Alkaline Phosphatase  180 H 


 


Total Protein  5.3 L 


 


Albumin  2.6 L 


 


Lipase  217.1 











Impressions: 


 





Abdomen/Pelvis CT  02/18/18 23:58


IMPRESSION:


 


1. Cholelithiasis with minimal nonspecific pericholecystic fluid.


Consider ultrasound and/or laboratory correlation, as clinically


warranted.


2. Aortobiiliac repair appears grossly intact on nondedicated


exam. There are small likely iatrogenic gas bubbles within the


native AAA decreased in overall diameter.


3. Moderate-grade bladder outlet obstruction pattern including


moderate bilateral hydronephrosis/hydroureter, right more than


left. Similar process on prior exam, 1/27/2018. Urology


consultation advised.


 








Head CT  02/18/18 23:58


IMPRESSION: No acute findings.


 








Abdomen Ultrasound  02/19/18 03:24


IMPRESSION:


 


Cholelithiasis with positive sonographic Saunders's test.


Differential diagnosis includes acute/chronic cholecystitis and


biliary colic.


 














Assessment & Plan





- Diagnosis


(1) Acute gallstone pancreatitis


Is this a current diagnosis for this admission?: Yes   


Plan: 


Improving, continues to endorse pain


- Admission CT: Cholelithiasis with minimal nonspecific pericholecystic fluid, 

abdominal US: Cholelithiasis with positive sonographic Saunders's test


- Lipase trending down, LFTs marginally increased, afebrile and HDS


- Continue IVF


- Blood cultures negative


- Surgery following, likely lap ozzie this admission once improvement in 

symptoms, patient agreeable with plan 








(2) Acute renal injury


Is this a current diagnosis for this admission?: Yes   


Plan: 


Likely pre-renal azotemia, Improving, Cr 1 on 2/20, decreased from 1/5 on 2/19











(3) Dementia


Qualifiers: 


 


Is this a current diagnosis for this admission?: Yes   


Plan: 


Oriented only to self, currently at baseline








(4) AAA (abdominal aortic aneurysm)


Qualifiers: 


   Presence of rupture: without rupture   Qualified Code(s): I71.4 - Abdominal 

aortic aneurysm, without rupture   


Is this a current diagnosis for this admission?: Yes   


Plan: 


Known, re-demonstrated on admission CT.








(5) Constipation


Is this a current diagnosis for this admission?: Yes   


Plan: 


Improved, CTM








(6) Anemia


Qualifiers: 


   Anemia type: unspecified type   Qualified Code(s): D64.9 - Anemia, 

unspecified   


Is this a current diagnosis for this admission?: Yes   


Plan: 


Normocytic, H&h 7.6 from 9.8


- No active signs of bleeding, likely hemodilution from IVF


- Continue to monitor, will re-check H&H at 4pm on 2/20








- Time


Time Spent with patient: 15-24 minutes


Anticipated discharge: Acute Rehab





- Inpatient Certification


Medical Necessity: Need For IV Fluids, Need for Surgery

## 2018-02-20 NOTE — PDOC PROGRESS REPORT
Subjective


Progress Note for:: 02/20/18


Subjective:: 





Patient has had 2 BM today with improvement of her abdominal pain


Reason For Visit: 


ACUTE GALLSTONE,PANCREATITIS,BLADDER OUTLET








Physical Exam


Vital Signs: 


 











Temp Pulse Resp BP Pulse Ox


 


 99.6 F   89   16   128/62 H  95 


 


 02/20/18 16:14  02/20/18 16:14  02/20/18 03:53  02/20/18 16:14  02/20/18 16:14








 Intake & Output











 02/19/18 02/20/18 02/21/18





 06:59 06:59 06:59


 


Intake Total   3400


 


Output Total   2200


 


Balance   1200











GI/Abdominal exam: PRESENT: soft, tenderness - in the suprapubic area





Results


Laboratory Results: 


 





 02/20/18 16:06 





 











  02/20/18





  16:06


 


WBC  7.5


 


RBC  2.57 L


 


Hgb  7.9 L


 


Hct  23.6 L


 


MCV  92


 


MCH  30.8


 


MCHC  33.6


 


RDW  14.3 H


 


Plt Count  277











Impressions: 


 





Abdomen/Pelvis CT  02/18/18 23:58


IMPRESSION:


 


1. Cholelithiasis with minimal nonspecific pericholecystic fluid.


Consider ultrasound and/or laboratory correlation, as clinically


warranted.


2. Aortobiiliac repair appears grossly intact on nondedicated


exam. There are small likely iatrogenic gas bubbles within the


native AAA decreased in overall diameter.


3. Moderate-grade bladder outlet obstruction pattern including


moderate bilateral hydronephrosis/hydroureter, right more than


left. Similar process on prior exam, 1/27/2018. Urology


consultation advised.


 








Head CT  02/18/18 23:58


IMPRESSION: No acute findings.


 








Abdomen Ultrasound  02/19/18 03:24


IMPRESSION:


 


Cholelithiasis with positive sonographic Saunders's test.


Differential diagnosis includes acute/chronic cholecystitis and


biliary colic.


 














Assessment & Plan





- Diagnosis


(1) Acute gallstone pancreatitis


Is this a current diagnosis for this admission?: Yes   





- Plan Summary


Plan Summary: 





A/





Patient abdominal pain improved


Pancreatic enzymes normalizing


Bowel function prsent with bowel movements today








P/





Will hold off any procedure at this time as the pancreatitis is regressing and 

the abdominal pain is improved.





patient is not a good surgical candidate.

## 2018-02-21 VITALS — SYSTOLIC BLOOD PRESSURE: 135 MMHG | DIASTOLIC BLOOD PRESSURE: 85 MMHG

## 2018-02-21 LAB
ALBUMIN SERPL-MCNC: 2.9 G/DL (ref 3.5–5)
ALP SERPL-CCNC: 190 U/L (ref 38–126)
ALT SERPL-CCNC: 44 U/L (ref 9–52)
ANION GAP SERPL CALC-SCNC: 8 MMOL/L (ref 5–19)
AST SERPL-CCNC: 40 U/L (ref 14–36)
BILIRUB DIRECT SERPL-MCNC: 0.4 MG/DL (ref 0–0.4)
BILIRUB SERPL-MCNC: 0.4 MG/DL (ref 0.2–1.3)
BUN SERPL-MCNC: 8 MG/DL (ref 7–20)
CALCIUM: 8.4 MG/DL (ref 8.4–10.2)
CHLORIDE SERPL-SCNC: 109 MMOL/L (ref 98–107)
CO2 SERPL-SCNC: 23 MMOL/L (ref 22–30)
ERYTHROCYTE [DISTWIDTH] IN BLOOD BY AUTOMATED COUNT: 14.4 % (ref 11.5–14)
GLUCOSE SERPL-MCNC: 105 MG/DL (ref 75–110)
HCT VFR BLD CALC: 25.7 % (ref 36–47)
HGB BLD-MCNC: 8.6 G/DL (ref 12–15.5)
MCH RBC QN AUTO: 30.7 PG (ref 27–33.4)
MCHC RBC AUTO-ENTMCNC: 33.7 G/DL (ref 32–36)
MCV RBC AUTO: 91 FL (ref 80–97)
PLATELET # BLD: 319 10^3/UL (ref 150–450)
POTASSIUM SERPL-SCNC: 4.2 MMOL/L (ref 3.6–5)
PROT SERPL-MCNC: 6.1 G/DL (ref 6.3–8.2)
RBC # BLD AUTO: 2.81 10^6/UL (ref 3.72–5.28)
SODIUM SERPL-SCNC: 140.1 MMOL/L (ref 137–145)
WBC # BLD AUTO: 6.4 10^3/UL (ref 4–10.5)

## 2018-02-21 RX ADMIN — DOCUSATE SODIUM SCH MG: 100 CAPSULE, LIQUID FILLED ORAL at 11:03

## 2018-02-21 RX ADMIN — POLYETHYLENE GLYCOL 3350 SCH GM: 17 POWDER, FOR SOLUTION ORAL at 11:04

## 2018-02-21 RX ADMIN — HEPARIN SODIUM SCH UNIT: 5000 INJECTION, SOLUTION INTRAVENOUS; SUBCUTANEOUS at 05:10

## 2018-02-21 RX ADMIN — ASPIRIN SCH MG: 81 TABLET, COATED ORAL at 11:03

## 2018-02-21 RX ADMIN — FLUTICASONE PROPIONATE SCH SPRAY: 50 SPRAY, METERED NASAL at 13:16

## 2018-02-21 RX ADMIN — LACTULOSE SCH GM: 20 SOLUTION ORAL at 11:04

## 2018-02-21 RX ADMIN — LANSOPRAZOLE SCH MG: 30 TABLET, ORALLY DISINTEGRATING, DELAYED RELEASE ORAL at 11:03

## 2018-02-21 RX ADMIN — Medication SCH MG: at 11:03

## 2018-02-21 RX ADMIN — CYANOCOBALAMIN TAB 1000 MCG SCH MCG: 1000 TAB at 11:02

## 2018-02-21 RX ADMIN — METOPROLOL SUCCINATE SCH MG: 25 TABLET, EXTENDED RELEASE ORAL at 11:02

## 2018-02-21 RX ADMIN — HEPARIN SODIUM SCH UNIT: 5000 INJECTION, SOLUTION INTRAVENOUS; SUBCUTANEOUS at 17:43

## 2018-02-21 NOTE — PDOC TRANSFER SUMMARY
General





- Admit/Disc Date/PCP


Admission Date/Primary Care Provider: 


  02/20/18 12:30





  





Discharge Date: 02/21/18





- Discharge Diagnosis


(1) Acute gallstone pancreatitis


Is this a current diagnosis for this admission?: Yes   








(3) Anemia


Is this a current diagnosis for this admission?: Yes   





(4) Dementia


Is this a current diagnosis for this admission?: Yes   





(5) Fecal impaction in rectum


Is this a current diagnosis for this admission?: Yes   





(6) AAA (abdominal aortic aneurysm)


Is this a current diagnosis for this admission?: Yes   


Summary: 


No; redemonstrated on admission CT








(7) Constipation


Is this a current diagnosis for this admission?: Yes   





(8) AAA (abdominal aortic aneurysm) without rupture


Is this a current diagnosis for this admission?: Yes   





- Additional Information


Resuscitation Status: Do Not Resuscitate


Discharge Diet: Regular


Discharge Activity: Activity As Tolerated, Balance Activity w/Rest


Prescriptions: 


Polyethylene Glycol 3350 [Miralax Powder 17 gm/Packet] 1 packet PO DAILY #30 

powd.pack


Home Medications: 








Acetaminophen [Tylenol 325 mg Tablet] 650 mg PO Q4HP PRN 02/19/18 


Aspirin [Aspirin EC] 81 mg PO DAILY 02/19/18 


Atorvastatin Calcium [Lipitor 10 mg Tablet] 10 mg PO DAILY 02/19/18 


Cold Cream/Zinc Oxide/Star/Quique [Dermacloud Ointment] 1 applic TOP PRN PRN 02/19/ 18 


Cyanocobalamin (Vitamin B-12) [Vitamin B-12] 1,000 mcg PO DAILY 02/19/18 


Docusate Sodium [Colace 100 mg Capsule] 100 mg PO BID 02/19/18 


Fluticasone Propionate [Flonase Nasal Spray 50 Mcg/Spray 16 gm] 2 spray NASL 

DAILY 02/19/18 


Haloperidol [Haldol 0.5 mg Tablet] 0.5 mg PO Q6HP PRN 02/19/18 


Mag Hydrox/Al Hydrox/Simeth [Maalox Plus Susp 30 Udcup] 30 ml PO Q6HP PRN 02/19/ 18 


Magnesium Hydroxide [Milk of Magnesia 30 ml Udcup] 30 ml PO DAILYP PRN 02/19/18 


Metoprolol Succinate [Toprol Xl 25 mg Tab.sr] 12.5 mg PO DAILY 02/19/18 


Nitroglycerin [Nitrostat] 0.4 mg SL Q5MP PRN 02/19/18 


Omeprazole 40 mg PO DAILY 02/19/18 


Quetiapine Fumarate [Seroquel] 50 mg PO QHS 02/19/18 


Sennosides [Senna] 2 tab PO QHS 02/19/18 


Thiamine Mononitrate (Vit B1) [Vitamin B-1] 100 mg PO DAILY 02/19/18 


Polyethylene Glycol 3350 [Miralax Powder 17 gm/Packet] 1 packet PO DAILY #30 

powd.pack 02/21/18 











History of Present Illness


Admission Date/PCP: 


  02/20/18 12:30





  





History of Present Illness: 


Her H&P by Dr. Hatfield: YOKO ACEVEDO is a 77 year old female nursing home 

resident with a history of Alzheimer's disease oriented to self but socially 

appropriate.  History includes 6 cm abdominal aortic aneurysm, chronic 

bilateral hydronephrosis, stage II chronic kidney disease, cholelithiasis, and 

recurrent chronic constipation.  Patient presents with left lower quadrant 

abdominal pain, CT abdomen and pelvis reveals chronicity and stability of the 

aortic aneurysm, hydronephrosis and cholelithiasis.  It also shows marketed 

fecal impaction.  In the emergency room she receives manual disimpaction is 

referred to the hospitalist for observation.  Patient denies pain currently and 

requests something to eat.








Hospital Course


Hospital Course: 


Evaluation included a CT of the abdomen and pelvis revealed lithiasis with 

minimal nonspecific pericholecystic fluid.  Aortobiiliac repair appears grossly 

intact on the nondedicated exam.  Moderate grade bladder outlet obstruction 

pattern including moderate bilateral hydronephrosis/hydroureter as similarly 

seen on prior exam.  A follow-up abdominal ultrasound was obtained and 

confirmedn ultrasound of the abdomen which revealed cholelithiasis with 

positive Saunders's test.


Surgery was consulted and have recommended against a laparoscopic 

cholecystectomy at this time as her bowel function has returned to normal and 

her symptoms have resolved.


The patient received a manual disimpaction with moderate results.  She was 

placed on a bowel regimen including lactulose, Colace, and daily enema 3 days.

  She was initially n.p.o., and her diet advanced as symptoms resolved.  She is 

now tolerating a regular diet and having having regular soft bowel movements.


The patient recieved IV fluids with downward trend of her creatinine from 1.48-

1.02.  Her hemoglobin has stabilized; today Hgb is 8.6.


On day of discharge, the patient is stable, tolerating a regular diet, and 

having regular bowel movements.  Has been cleared by surgery and is discharged 

to the [facility] where she is a long-term resident.








Physical Exam


Vital Signs: 


 











Temp Pulse Resp BP Pulse Ox


 


 98.1 F   92   16   135/85 H  92 


 


 02/21/18 03:00  02/21/18 08:00  02/21/18 08:00  02/21/18 03:00  02/21/18 08:00








 Intake & Output











 02/20/18 02/21/18 02/22/18





 06:59 06:59 06:59


 


Intake Total  4520 


 


Output Total  3000 


 


Balance  1520 


 


Weight  49.8 kg 











General appearance: PRESENT: no acute distress, cooperative, thin, well-

developed


Head exam: PRESENT: atraumatic, normocephalic


Eye exam: PRESENT: conjunctiva pink, EOMI, PERRLA.  ABSENT: scleral icterus


Ear exam: PRESENT: normal external ear exam


Mouth exam: PRESENT: moist, tongue midline


Neck exam: ABSENT: carotid bruit, JVD, lymphadenopathy, thyromegaly


Respiratory exam: PRESENT: clear to auscultation [place], symmetrical, unlabored.  

ABSENT: rales, rhonchi, wheezes


Cardiovascular exam: PRESENT: RRR, +S1, +S2.  ABSENT: diastolic murmur, rubs, 

systolic murmur


Pulses: PRESENT: normal dorsalis pedis pul


Vascular exam: PRESENT: normal capillary refill


GI/Abdominal exam: PRESENT: normal bowel sounds, soft.  ABSENT: distended, 

guarding, mass, organolmegaly, rebound, tenderness


Rectal exam: PRESENT: deferred


Extremities exam: PRESENT: full ROM.  ABSENT: calf tenderness, clubbing, pedal 

edema


Neurological exam: PRESENT: alert, awake, oriented to person, CN II-XII grossly 

intact, other - Pleasantly confused, socially appropriate, and cooperative.  

ABSENT: motor sensory deficit


Psychiatric exam: PRESENT: appropriate affect, normal mood.  ABSENT: homicidal 

ideation, suicidal ideation


Skin exam: PRESENT: dry, intact, warm.  ABSENT: cyanosis, rash





Results


Laboratory Results: 


 





 02/21/18 11:15 





 02/21/18 11:15 





 











  02/20/18 02/21/18 02/21/18





  16:06 11:15 11:15


 


WBC  7.5  6.4 


 


RBC  2.57 L  2.81 L 


 


Hgb  7.9 L  8.6 L 


 


Hct  23.6 L  25.7 L 


 


MCV  92  91 


 


MCH  30.8  30.7 


 


MCHC  33.6  33.7 


 


RDW  14.3 H  14.4 H 


 


Plt Count  277  319 


 


Sodium    140.1


 


Potassium    4.2


 


Chloride    109 H


 


Carbon Dioxide    23


 


Anion Gap    8


 


BUN    8


 


Creatinine    1.02


 


Est GFR ( Amer)    > 60


 


Est GFR (Non-Af Amer)    53 L


 


Glucose    105


 


Calcium    8.4


 


Total Bilirubin    0.4


 


AST    40 H


 


ALT    44


 


Alkaline Phosphatase    190 H


 


Total Protein    6.1 L


 


Albumin    2.9 L











Impressions: 


 





Abdomen/Pelvis CT  02/18/18 23:58


IMPRESSION:


 


1. Cholelithiasis with minimal nonspecific pericholecystic fluid.


Consider ultrasound and/or laboratory correlation, as clinically


warranted.


2. Aortobiiliac repair appears grossly intact on nondedicated


exam. There are small likely iatrogenic gas bubbles within the


native AAA decreased in overall diameter.


3. Moderate-grade bladder outlet obstruction pattern including


moderate bilateral hydronephrosis/hydroureter, right more than


left. Similar process on prior exam, 1/27/2018. Urology


consultation advised.


 








Head CT  02/18/18 23:58


IMPRESSION: No acute findings.


 








Abdomen Ultrasound  02/19/18 03:24


IMPRESSION:


 


Cholelithiasis with positive sonographic Saunders's test.


Differential diagnosis includes acute/chronic cholecystitis and


biliary colic.


 














Transfer Plan





- Time Spent with Patient


Time spent with patient: Less than 30 Minutes





Qualifiers





- *


**PATEINT BEING DISCHARGED WITH ANY OF THE FOLLOWING DIAGNOSIS?: No

## 2018-02-22 NOTE — PHYSICIAN ADVISORY NOTE
Physician Advisor ProgressNote


.: 


[facility] medical record 

for this patient.  





Physician Advisor Statement: 





Please consider documenting, if you agree:


1.  "Chronic ____ type anemia"  (nutritional ____ deficiency?  blood loss due 

to ____?)


2.  type dementia








Thanks!


CK

## 2018-02-24 ENCOUNTER — HOSPITAL ENCOUNTER (EMERGENCY)
Dept: HOSPITAL 62 - ER | Age: 78
Discharge: HOME | End: 2018-02-24
Payer: MEDICARE

## 2018-02-24 VITALS — SYSTOLIC BLOOD PRESSURE: 148 MMHG | DIASTOLIC BLOOD PRESSURE: 73 MMHG

## 2018-02-24 DIAGNOSIS — E78.00: ICD-10-CM

## 2018-02-24 DIAGNOSIS — G30.9: ICD-10-CM

## 2018-02-24 DIAGNOSIS — R10.84: Primary | ICD-10-CM

## 2018-02-24 DIAGNOSIS — F02.80: ICD-10-CM

## 2018-02-24 DIAGNOSIS — I10: ICD-10-CM

## 2018-02-24 DIAGNOSIS — R53.1: ICD-10-CM

## 2018-02-24 LAB
ADD MANUAL DIFF: NO
ALBUMIN SERPL-MCNC: 3.5 G/DL (ref 3.5–5)
ALP SERPL-CCNC: 150 U/L (ref 38–126)
ALT SERPL-CCNC: 25 U/L (ref 9–52)
ANION GAP SERPL CALC-SCNC: 9 MMOL/L (ref 5–19)
APPEARANCE UR: CLEAR
APTT PPP: YELLOW S
AST SERPL-CCNC: 41 U/L (ref 14–36)
BASOPHILS # BLD AUTO: 0.1 10^3/UL (ref 0–0.2)
BASOPHILS NFR BLD AUTO: 1.1 % (ref 0–2)
BILIRUB DIRECT SERPL-MCNC: 0.4 MG/DL (ref 0–0.4)
BILIRUB SERPL-MCNC: 0.4 MG/DL (ref 0.2–1.3)
BILIRUB UR QL STRIP: NEGATIVE
BUN SERPL-MCNC: 13 MG/DL (ref 7–20)
CALCIUM: 8.6 MG/DL (ref 8.4–10.2)
CHLORIDE SERPL-SCNC: 104 MMOL/L (ref 98–107)
CO2 SERPL-SCNC: 27 MMOL/L (ref 22–30)
EOSINOPHIL # BLD AUTO: 0.2 10^3/UL (ref 0–0.6)
EOSINOPHIL NFR BLD AUTO: 3 % (ref 0–6)
ERYTHROCYTE [DISTWIDTH] IN BLOOD BY AUTOMATED COUNT: 14.6 % (ref 11.5–14)
GLUCOSE SERPL-MCNC: 93 MG/DL (ref 75–110)
GLUCOSE UR STRIP-MCNC: NEGATIVE MG/DL
HCT VFR BLD CALC: 26.2 % (ref 36–47)
HGB BLD-MCNC: 8.7 G/DL (ref 12–15.5)
KETONES UR STRIP-MCNC: NEGATIVE MG/DL
LIPASE SERPL-CCNC: 229.8 U/L (ref 23–300)
LYMPHOCYTES # BLD AUTO: 1.4 10^3/UL (ref 0.5–4.7)
LYMPHOCYTES NFR BLD AUTO: 21.4 % (ref 13–45)
MCH RBC QN AUTO: 30.1 PG (ref 27–33.4)
MCHC RBC AUTO-ENTMCNC: 33.3 G/DL (ref 32–36)
MCV RBC AUTO: 91 FL (ref 80–97)
MONOCYTES # BLD AUTO: 0.6 10^3/UL (ref 0.1–1.4)
MONOCYTES NFR BLD AUTO: 9.9 % (ref 3–13)
NEUTROPHILS # BLD AUTO: 4.1 10^3/UL (ref 1.7–8.2)
NEUTS SEG NFR BLD AUTO: 64.6 % (ref 42–78)
NITRITE UR QL STRIP: NEGATIVE
PH UR STRIP: 7 [PH] (ref 5–9)
PLATELET # BLD: 388 10^3/UL (ref 150–450)
POTASSIUM SERPL-SCNC: 4.7 MMOL/L (ref 3.6–5)
PROT SERPL-MCNC: 7.4 G/DL (ref 6.3–8.2)
PROT UR STRIP-MCNC: NEGATIVE MG/DL
RBC # BLD AUTO: 2.89 10^6/UL (ref 3.72–5.28)
SODIUM SERPL-SCNC: 140.3 MMOL/L (ref 137–145)
SP GR UR STRIP: 1.01
TOTAL CELLS COUNTED % (AUTO): 100 %
UROBILINOGEN UR-MCNC: NEGATIVE MG/DL (ref ?–2)
WBC # BLD AUTO: 6.4 10^3/UL (ref 4–10.5)

## 2018-02-24 PROCEDURE — 93005 ELECTROCARDIOGRAM TRACING: CPT

## 2018-02-24 PROCEDURE — 81001 URINALYSIS AUTO W/SCOPE: CPT

## 2018-02-24 PROCEDURE — 74177 CT ABD & PELVIS W/CONTRAST: CPT

## 2018-02-24 PROCEDURE — 85025 COMPLETE CBC W/AUTO DIFF WBC: CPT

## 2018-02-24 PROCEDURE — 80053 COMPREHEN METABOLIC PANEL: CPT

## 2018-02-24 PROCEDURE — 93010 ELECTROCARDIOGRAM REPORT: CPT

## 2018-02-24 PROCEDURE — 83690 ASSAY OF LIPASE: CPT

## 2018-02-24 PROCEDURE — 99285 EMERGENCY DEPT VISIT HI MDM: CPT

## 2018-02-24 PROCEDURE — 36415 COLL VENOUS BLD VENIPUNCTURE: CPT

## 2018-02-24 PROCEDURE — 84484 ASSAY OF TROPONIN QUANT: CPT

## 2018-02-24 NOTE — EKG REPORT
SEVERITY:- ABNORMAL ECG -

SINUS RHYTHM

LEFT ANTERIOR FASCICULAR BLOCK

NONSPECIFIC T ABNORMALITIES, ANTERIOR LEADS

:

Confirmed by: Jan Rodriguez MD 24-Feb-2018 17:23:23

## 2018-02-24 NOTE — ER DOCUMENT REPORT
ED General





- General


Chief Complaint: Abdominal Problem


Stated Complaint: WEAKNESS


Time Seen by Provider: 02/24/18 09:45


Mode of Arrival: Medic


Information source: Patient


Notes: 





77-year-old female who presents from the [facility] nursing care facility with 

complaints according to EMS of a possible fall.  Patient has severe dementia 

and does not remember today's events.  She to me denies any pain.  Specifically 

denies any headache, neck pain, chest pain, back pain, abdominal pain, or 

extremity pain.  She is very pleasant but is not oriented to place, president, 

or year.





I called the [facility] and spoke to Maral.  She states stent placed two weeks ago in 

[place] for a AAA stent.  Supposedly this am states pt was sitting on the 

floor stating her abdomen hurt.  No F, V, or diarrhea.  She states that she has 

complained of intermittent abdominal pain for 6 months.


TRAVEL OUTSIDE OF THE [place] IN LAST 30 DAYS: No





- HPI


Onset: Other - See above


Onset/Duration: Gradual


Quality of pain: Achy


Severity: Mild


Pain Level: Denies


Associated symptoms: Other - See above


Exacerbated by: Denies


Relieved by: Denies


Similar symptoms previously: No


Recently seen / treated by doctor: No





- Related Data


Allergies/Adverse Reactions: 


 





No Known Allergies Allergy (Verified 02/19/18 00:42)


 











Past Medical History





- General


Information source: Patient





- Social History


Smoking Status: Unknown if Ever Smoked


Cigarette use (# per day): No


Chew tobacco use (# tins/day): No


Smoking Education Provided: No


Family History: Reviewed & Not Pertinent, Other - Patient suffers from dementia


Patient has suicidal ideation: No


Patient has homicidal ideation: No





- Past Medical History


Cardiac Medical History: Reports: Hx Coronary Artery Disease, Hx 

Hypercholesterolemia, Hx Hypertension


Renal/ Medical History: Denies: Hx Peritoneal Dialysis


GI Medical History: Reports: Hx Gastroesophageal Reflux Disease


Musculoskeltal Medical History: Reports Hx Arthritis


Psychiatric Medical History: Reports: Hx Dementia - Alzheimer's


Past Surgical History: Reports: Hx Abdominal Surgery, Hx Vascular Surgery - AAA 

biforcated stent placement





- Immunizations


Hx Diphtheria, Pertussis, Tetanus Vaccination: No





Review of Systems





- Review of Systems


Constitutional: denies: Fever


EENT: denies: Eye discharge, Nose discharge


Respiratory: denies: Short of breath


Gastrointestinal: Abdominal pain.  denies: Vomiting


Genitourinary: denies: Dysuria


Musculoskeletal: denies: Leg swelling


Skin: Other - no hives.  denies: Rash


Neurological/Psychological: Other - no slurred speech


-: Yes All other systems reviewed and negative





Physical Exam





- Vital signs


Vitals: 


 











Temp Pulse Resp BP Pulse Ox


 


 99.2 F   89   18   132/68 H  95 


 


 02/24/18 09:22  02/24/18 09:22  02/24/18 09:22  02/24/18 09:22  02/24/18 09:22











Notes: 





Reviewed vital signs and nursing note as charted by RN.





CONSTITUTIONAL: Alert and polite but not oriented at this time which is 

baseline for the patient. Well-appearing; well-nourished





HEAD: Normocephalic; atraumatic





EYES: Sclerae non-icteric





ENT: Normal nose; no rhinorrhea; moist mucous membranes; pharynx without 

lesions noted





NECK: Supple without meningismus; non-tender





CARD: Regular rate and rhythm; no murmurs





RESP: Normal chest excursion without splinting or tachypnea; breath sounds 

clear and equal bilaterally





ABD/GI: Normal bowel sounds; non-distended; soft, non-tender currently to 

palpation of all 4 quadrants of the abdomen.  No abdominal bruits or palpable 

masses.  Very old midline surgical scar.  I do not see any laparoscopy marks at 

this time





BACK: The back appears normal and is non-tender to palpation, there is no CVA 

tenderness





EXT: Normal ROM in all joints; non-tender to palpation; no edema





SKIN: No acute lesions noted





NEURO: Moves all extremities equally; Motor and sensory function intact





PSYCH: The patient's mood and manner are appropriate. Grooming and personal 

hygiene are appropriate.





Course





- Re-evaluation


Re-evalutation: 





02/24/18 09:50


Given the history, physical, recent procedure, we will obtain basic labs as 

well as CT imaging of the abdomen and pelvis.  If this is unremarkable, I 

believe it is reasonable to discharge the patient back to the facility.  

Patient complains of no pain at this time.





02/24/18 09:52


Reviewing the patient's medical chart it appears that the patient was actually 

discharged here 2 days ago secondary to some abdominal pain.





Her H&P by Dr. Hatfield: YOKO ACEVEDO is a 77 year old female nursing home 

resident with a history of Alzheimer's disease oriented to self but socially 

appropriate.  History includes 6 cm abdominal aortic aneurysm, chronic 

bilateral hydronephrosis, stage II chronic kidney disease, cholelithiasis, and 

recurrent chronic constipation.  Patient presents with left lower quadrant 

abdominal pain, CT abdomen and pelvis reveals chronicity and stability of the 

aortic aneurysm, hydronephrosis and cholelithiasis.  It also shows marketed 

fecal impaction.  In the emergency room she receives manual disimpaction is 

referred to the hospitalist for observation.  Patient denies pain currently and 

requests something to eat.








Hospital Course


Hospital Course: 


Evaluation included a CT of the abdomen and pelvis revealed lithiasis with 

minimal nonspecific pericholecystic fluid.  Aortobiiliac repair appears grossly 

intact on the nondedicated exam.  Moderate grade bladder outlet obstruction 

pattern including moderate bilateral hydronephrosis/hydroureter as similarly 

seen on prior exam.  A follow-up abdominal ultrasound was obtained and 

confirmedn ultrasound of the abdomen which revealed cholelithiasis with 

positive Saunders's test.


Surgery was consulted and have recommended against a laparoscopic 

cholecystectomy at this time as her bowel function has returned to normal and 

her symptoms have resolved.


The patient received a manual disimpaction with moderate results. 





Given this information we will obtain basic labs, liver panel, lipase, and a CT 

scan of the abdomen and pelvis for further evaluation.





02/24/18 12:31


Labs as recorded.  We are waiting for CT imaging.





02/24/18 14:31


Labs as recorded.  CT as recorded.  I performed a digital rectal examination I 

detect no obvious impaction.  Patient had a bowel movement here in the 

emergency department that was brown in color with no obvious blood.  Hemocult 

negative stool.





Given the history, physical, still no tenderness objectively or subjectively, 

CT and imaging as recorded, I do not believe that the patient requires any 

further laboratory values or imaging at this time.  Patient will be discharged 

home with strict return precautions and follow-up with the primary care 

provider.











- Vital Signs


Vital signs: 


 











Temp Pulse Resp BP Pulse Ox


 


 99.2 F   89   20   161/88 H  100 


 


 02/24/18 09:22  02/24/18 09:22  02/24/18 11:46  02/24/18 11:46  02/24/18 11:46














- Laboratory


Result Diagrams: 


 02/24/18 11:20





 02/24/18 11:20


Laboratory results interpreted by me: 


 











  02/24/18 02/24/18





  11:20 11:20


 


RBC  2.89 L 


 


Hgb  8.7 L 


 


Hct  26.2 L 


 


RDW  14.6 H 


 


Est GFR ( Amer)   53 L


 


Est GFR (Non-Af Amer)   44 L


 


AST   41 H


 


Alkaline Phosphatase   150 H














Discharge





- Discharge


Clinical Impression: 


 Abdominal discomfort





Condition: Good


Disposition: HOME, SELF-CARE


Additional Instructions: 


Come back immediately for any return of pain, fever, vomiting, change in mental 

status, or any other acute problems.  Please follow up with the primary 

provider as we have discussed.

## 2018-02-25 ENCOUNTER — HOSPITAL ENCOUNTER (EMERGENCY)
Dept: HOSPITAL 62 - ER | Age: 78
Discharge: HOME HEALTH | End: 2018-02-25
Payer: MEDICARE

## 2018-02-25 VITALS — SYSTOLIC BLOOD PRESSURE: 152 MMHG | DIASTOLIC BLOOD PRESSURE: 87 MMHG

## 2018-02-25 DIAGNOSIS — R10.9: ICD-10-CM

## 2018-02-25 DIAGNOSIS — R11.2: Primary | ICD-10-CM

## 2018-02-25 LAB
ADD MANUAL DIFF: NO
ALBUMIN SERPL-MCNC: 3.3 G/DL (ref 3.5–5)
ALP SERPL-CCNC: 146 U/L (ref 38–126)
ALT SERPL-CCNC: 34 U/L (ref 9–52)
ANION GAP SERPL CALC-SCNC: 16 MMOL/L (ref 5–19)
AST SERPL-CCNC: 36 U/L (ref 14–36)
BASOPHILS # BLD AUTO: 0.1 10^3/UL (ref 0–0.2)
BASOPHILS NFR BLD AUTO: 0.8 % (ref 0–2)
BILIRUB DIRECT SERPL-MCNC: 0.3 MG/DL (ref 0–0.4)
BILIRUB SERPL-MCNC: 0.3 MG/DL (ref 0.2–1.3)
BUN SERPL-MCNC: 15 MG/DL (ref 7–20)
CALCIUM: 8.5 MG/DL (ref 8.4–10.2)
CHLORIDE SERPL-SCNC: 98 MMOL/L (ref 98–107)
CO2 SERPL-SCNC: 25 MMOL/L (ref 22–30)
EOSINOPHIL # BLD AUTO: 0.1 10^3/UL (ref 0–0.6)
EOSINOPHIL NFR BLD AUTO: 1.6 % (ref 0–6)
ERYTHROCYTE [DISTWIDTH] IN BLOOD BY AUTOMATED COUNT: 14.3 % (ref 11.5–14)
GLUCOSE SERPL-MCNC: 139 MG/DL (ref 75–110)
HCT VFR BLD CALC: 27.9 % (ref 36–47)
HGB BLD-MCNC: 9.4 G/DL (ref 12–15.5)
LIPASE SERPL-CCNC: 333.6 U/L (ref 23–300)
LYMPHOCYTES # BLD AUTO: 1 10^3/UL (ref 0.5–4.7)
LYMPHOCYTES NFR BLD AUTO: 14.1 % (ref 13–45)
MCH RBC QN AUTO: 30.5 PG (ref 27–33.4)
MCHC RBC AUTO-ENTMCNC: 33.8 G/DL (ref 32–36)
MCV RBC AUTO: 90 FL (ref 80–97)
MONOCYTES # BLD AUTO: 0.6 10^3/UL (ref 0.1–1.4)
MONOCYTES NFR BLD AUTO: 8.9 % (ref 3–13)
NEUTROPHILS # BLD AUTO: 5.3 10^3/UL (ref 1.7–8.2)
NEUTS SEG NFR BLD AUTO: 74.6 % (ref 42–78)
PLATELET # BLD: 423 10^3/UL (ref 150–450)
POTASSIUM SERPL-SCNC: 4.2 MMOL/L (ref 3.6–5)
PROT SERPL-MCNC: 6.9 G/DL (ref 6.3–8.2)
RBC # BLD AUTO: 3.09 10^6/UL (ref 3.72–5.28)
SODIUM SERPL-SCNC: 138.7 MMOL/L (ref 137–145)
TOTAL CELLS COUNTED % (AUTO): 100 %
WBC # BLD AUTO: 7.1 10^3/UL (ref 4–10.5)

## 2018-02-25 PROCEDURE — 99284 EMERGENCY DEPT VISIT MOD MDM: CPT

## 2018-02-25 PROCEDURE — 83690 ASSAY OF LIPASE: CPT

## 2018-02-25 PROCEDURE — 85025 COMPLETE CBC W/AUTO DIFF WBC: CPT

## 2018-02-25 PROCEDURE — 36415 COLL VENOUS BLD VENIPUNCTURE: CPT

## 2018-02-25 PROCEDURE — 80053 COMPREHEN METABOLIC PANEL: CPT

## 2018-02-25 NOTE — ER DOCUMENT REPORT
ED GI/





- General


Chief Complaint: Nausea/Vomiting


Stated Complaint: FLU LIKE SYMPTOMS


Time Seen by Provider: 02/25/18 09:32


Notes: 





77-year-old female who presents from the [facility] nursing [facility].  Patient was seen 

here yesterday for possible fall as well as some abdominal pain.  Patient has 

severe dementia and does not remember today's events.  She again at 

presentation today denies any and all pain.





I called the [facility] and spoke to the nurse.  Patient supposedly sitting today 

again and having some abdominal pain on the floor with vomiting 1.  No fevers 

or diarrhea.





As dictated yesterday, patient was recently admitted and discharged with a 

summary as recorded:  





"Her H&P by Dr. Hatfield: YOKO ACEVEDO is a 77 year old female nursing home 

resident with a history of Alzheimer's disease oriented to self but socially 

appropriate.  History includes 6 cm abdominal aortic aneurysm, chronic 

bilateral hydronephrosis, stage II chronic kidney disease, cholelithiasis, and 

recurrent chronic constipation.  Patient presents with left lower quadrant 

abdominal pain, CT abdomen and pelvis reveals chronicity and stability of the 

aortic aneurysm, hydronephrosis and cholelithiasis.  It also shows marketed 

fecal impaction.  In the emergency room she receives manual disimpaction is 

referred to the hospitalist for observation.  Patient denies pain currently and 

requests something to eat.








Hospital Course


Hospital Course: 


Evaluation included a CT of the abdomen and pelvis revealed lithiasis with 

minimal nonspecific pericholecystic fluid.  Aortobiiliac repair appears grossly 

intact on the nondedicated exam.  Moderate grade bladder outlet obstruction 

pattern including moderate bilateral hydronephrosis/hydroureter as similarly 

seen on prior exam.  A follow-up abdominal ultrasound was obtained and 

confirmedn ultrasound of the abdomen which revealed cholelithiasis with 

positive Saunders's test.


Surgery was consulted and have recommended against a laparoscopic 

cholecystectomy at this time as her bowel function has returned to normal and 

her symptoms have resolved.


The patient received a manual disimpaction with moderate results. "





Patient had labs, liver panel, lipase, CT scan of the abdomen and pelvis, and a 

urine analysis yesterday.  She did have a normal bowel movement.  Hemoccult 

negative.  CT scan of the abdomen and pelvis showed no obstruction and a normal 

gallbladder.  Patient again is denying any and all abdominal pain.








TRAVEL OUTSIDE OF THE [place] IN LAST 30 DAYS: No





- HPI


Patient complains to provider of: Abdominal pain


Onset: Other - See above


Timing/Duration: Gradual


Quality of pain: No pain


Severity at maximum: Mild


Severity in ED: None


Pain Level: Denies


Sexual history: Inactive


Associated symptoms: Other - See above


Exacerbated by: Denies


Relieved by: Denies


Similar symptoms previously: No


Recently seen / treated by doctor: No





- Related Data


Allergies/Adverse Reactions: 


 





No Known Allergies Allergy (Verified 02/25/18 09:41)


 











Past Medical History





- General


Information source: Patient





- Social History


Smoking Status: Never Smoker


Cigarette use (# per day): No


Chew tobacco use (# tins/day): No


Smoking Education Provided: No


Frequency of alcohol use: None


Drug Abuse: None


Family History: Reviewed & Not Pertinent, Other - Patient suffers from dementia


Patient has suicidal ideation: No


Patient has homicidal ideation: No





- Past Medical History


Cardiac Medical History: Reports: Hx Coronary Artery Disease, Hx 

Hypercholesterolemia, Hx Hypertension


Renal/ Medical History: Denies: Hx Peritoneal Dialysis


GI Medical History: Reports: Hx Gastroesophageal Reflux Disease


Musculoskeltal Medical History: Reports Hx Arthritis


Psychiatric Medical History: Reports: Hx Dementia - Alzheimer's


Past Surgical History: Reports: Hx Abdominal Surgery, Hx Vascular Surgery - AAA 

biforcated stent placement





- Immunizations


Hx Diphtheria, Pertussis, Tetanus Vaccination: No





Review of Systems





- Review of Systems


Constitutional: denies: Fever


EENT: denies: Eye discharge, Nose discharge


Cardiovascular: denies: Chest pain, Palpitations


Respiratory: denies: Short of breath


Gastrointestinal: denies: Vomiting


Genitourinary: denies: Dysuria


Musculoskeletal: denies: Leg swelling


Skin: Other - no hives.  denies: Rash


Neurological/Psychological: Other - no slurred speech


-: Yes All other systems reviewed and negative





Physical Exam





- Vital signs


Vitals: 


 











Temp Pulse Resp BP Pulse Ox


 


 98.8 F   91   18   114/64   94 


 


 02/25/18 09:30  02/25/18 09:30  02/25/18 09:30  02/25/18 09:30  02/25/18 09:30











Notes: 





Reviewed vital signs and nursing note as charted by RN.





CONSTITUTIONAL: Alert and polite. Well-appearing; well-nourished





HEAD: Normocephalic; atraumatic





EYES: Sclerae non-icteric





ENT: Moist mucous membranes; pharynx without lesions noted





NECK: Supple without meningismus; non-tender





CARD: Regular rate and rhythm; no murmurs





RESP: Normal chest excursion without splinting or tachypnea; breath sounds 

clear and equal bilaterall





ABD/GI: Normal bowel sounds; non-distended; soft, non-tender to deep palpation 

of all 4 quadrants of the abdomen.  No palpable masses or abdominal bruits 

present





BACK: The back appears normal and is non-tender to palpation, there is no CVA 

tenderness





EXT: Normal ROM in all joints; non-tender to palpation; no cyanosis, no 

effusions, no edema





SKIN: No acute lesions noted





NEURO: Moves all extremities equally; Motor and sensory function intact





PSYCH: The patient's mood and manner are appropriate. Grooming and personal 

hygiene are appropriate.





Course





- Re-evaluation


Re-evalutation: 





02/25/18 09:55


We will repeat the patient's liver panel and lipase.  We will also attempt an 

enema.  Patient had no impaction yesterday.  Large stool burden according to CT 

yesterday but the patient did have a bowel movement.  Patient denies any and 

all abdominal pain and has no tenderness to deep palpation of all 4 quadrants 

of the abdomen.  Patient is a DNR/DNI patient.





02/25/18 11:33


Hemoglobin has improved.  Lipase is only slightly elevated.  Patient still has 

no tenderness to deep palpation of all 4 quadrants of the abdomen.  Patient has 

been provided 2 enemas with good stool output.  Hemoccult negative.  I 

performed another rectal examination and still do not detect any fecal 

impaction.





Given the above history, physical, CT yesterday, labs repeated here, no 

vomiting here, no tenderness on repeat examination, patient will be discharged 

back to the facility.





- Vital Signs


Vital signs: 


 











Temp Pulse Resp BP Pulse Ox


 


 98.8 F   91   18   114/64   94 


 


 02/25/18 09:30  02/25/18 09:30  02/25/18 09:30  02/25/18 09:30  02/25/18 09:30














- Laboratory


Result Diagrams: 


 02/25/18 09:33





 02/25/18 09:33


Laboratory results interpreted by me: 


 











  02/25/18 02/25/18





  09:33 09:33


 


RBC  3.09 L 


 


Hgb  9.4 L 


 


Hct  27.9 L 


 


RDW  14.3 H 


 


Est GFR ( Amer)   53 L


 


Est GFR (Non-Af Amer)   44 L


 


Glucose   139 H


 


Alkaline Phosphatase   146 H


 


Albumin   3.3 L


 


Lipase   333.6 H














Discharge





- Discharge


Clinical Impression: 


Abdominal pain


Qualifiers:


 Abdominal location: unspecified location Qualified Code(s): R10.9 - 

Unspecified abdominal pain





Vomiting


Qualifiers:


 Vomiting type: unspecified Vomiting Intractability: non-intractable Nausea 

presence: unspecified Qualified Code(s): R11.10 - Vomiting, unspecified





Condition: Good


Additional Instructions: 


Come back immediately with any return of pain, persistent vomiting, fevers, or 

any other acute problems.  Please make sure that the on-site provider sees and 

evaluates the patient tomorrow in the facility.

## 2018-06-10 ENCOUNTER — HOSPITAL ENCOUNTER (INPATIENT)
Dept: HOSPITAL 62 - ER | Age: 78
LOS: 5 days | Discharge: NURSING, REHAB OR LONG-TERM CARE | DRG: 682 | End: 2018-06-15
Attending: INTERNAL MEDICINE | Admitting: INTERNAL MEDICINE
Payer: MEDICARE

## 2018-06-10 DIAGNOSIS — N17.9: Primary | ICD-10-CM

## 2018-06-10 DIAGNOSIS — K59.09: ICD-10-CM

## 2018-06-10 DIAGNOSIS — E78.00: ICD-10-CM

## 2018-06-10 DIAGNOSIS — E86.0: ICD-10-CM

## 2018-06-10 DIAGNOSIS — J18.1: ICD-10-CM

## 2018-06-10 DIAGNOSIS — I25.10: ICD-10-CM

## 2018-06-10 DIAGNOSIS — G30.9: ICD-10-CM

## 2018-06-10 DIAGNOSIS — I12.9: ICD-10-CM

## 2018-06-10 DIAGNOSIS — Z66: ICD-10-CM

## 2018-06-10 DIAGNOSIS — N39.0: ICD-10-CM

## 2018-06-10 DIAGNOSIS — R09.02: ICD-10-CM

## 2018-06-10 DIAGNOSIS — Z79.82: ICD-10-CM

## 2018-06-10 DIAGNOSIS — B96.20: ICD-10-CM

## 2018-06-10 DIAGNOSIS — M19.90: ICD-10-CM

## 2018-06-10 DIAGNOSIS — N18.2: ICD-10-CM

## 2018-06-10 DIAGNOSIS — E44.0: ICD-10-CM

## 2018-06-10 DIAGNOSIS — Z79.899: ICD-10-CM

## 2018-06-10 DIAGNOSIS — D63.1: ICD-10-CM

## 2018-06-10 DIAGNOSIS — K21.9: ICD-10-CM

## 2018-06-10 DIAGNOSIS — K80.20: ICD-10-CM

## 2018-06-10 DIAGNOSIS — N13.39: ICD-10-CM

## 2018-06-10 DIAGNOSIS — F02.80: ICD-10-CM

## 2018-06-10 LAB
ADD MANUAL DIFF: NO
ALBUMIN SERPL-MCNC: 4.1 G/DL (ref 3.5–5)
ALBUMIN SERPL-MCNC: 4.2 G/DL (ref 3.5–5)
ALP SERPL-CCNC: 159 U/L (ref 38–126)
ALP SERPL-CCNC: 161 U/L (ref 38–126)
ALT SERPL-CCNC: 17 U/L (ref 9–52)
ALT SERPL-CCNC: 22 U/L (ref 9–52)
ANION GAP SERPL CALC-SCNC: 15 MMOL/L (ref 5–19)
ANION GAP SERPL CALC-SCNC: 18 MMOL/L (ref 5–19)
APPEARANCE UR: (no result)
APTT PPP: YELLOW S
AST SERPL-CCNC: 37 U/L (ref 14–36)
AST SERPL-CCNC: 46 U/L (ref 14–36)
BASOPHILS # BLD AUTO: 0.1 10^3/UL (ref 0–0.2)
BASOPHILS NFR BLD AUTO: 1.2 % (ref 0–2)
BILIRUB DIRECT SERPL-MCNC: 0.5 MG/DL (ref 0–0.4)
BILIRUB DIRECT SERPL-MCNC: 0.7 MG/DL (ref 0–0.4)
BILIRUB SERPL-MCNC: 0.5 MG/DL (ref 0.2–1.3)
BILIRUB SERPL-MCNC: 0.8 MG/DL (ref 0.2–1.3)
BILIRUB UR QL STRIP: NEGATIVE
BUN SERPL-MCNC: 42 MG/DL (ref 7–20)
BUN SERPL-MCNC: 43 MG/DL (ref 7–20)
CALCIUM: 9 MG/DL (ref 8.4–10.2)
CALCIUM: 9.1 MG/DL (ref 8.4–10.2)
CHLORIDE SERPL-SCNC: 101 MMOL/L (ref 98–107)
CHLORIDE SERPL-SCNC: 101 MMOL/L (ref 98–107)
CK MB SERPL-MCNC: 0.66 NG/ML (ref ?–4.55)
CK SERPL-CCNC: 36 U/L (ref 30–135)
CO2 SERPL-SCNC: 19 MMOL/L (ref 22–30)
CO2 SERPL-SCNC: 22 MMOL/L (ref 22–30)
EOSINOPHIL # BLD AUTO: 0.1 10^3/UL (ref 0–0.6)
EOSINOPHIL NFR BLD AUTO: 0.9 % (ref 0–6)
ERYTHROCYTE [DISTWIDTH] IN BLOOD BY AUTOMATED COUNT: 16 % (ref 11.5–14)
GLUCOSE SERPL-MCNC: 133 MG/DL (ref 75–110)
GLUCOSE SERPL-MCNC: 145 MG/DL (ref 75–110)
GLUCOSE UR STRIP-MCNC: NEGATIVE MG/DL
HCT VFR BLD CALC: 31.3 % (ref 36–47)
HGB BLD-MCNC: 10.6 G/DL (ref 12–15.5)
KETONES UR STRIP-MCNC: NEGATIVE MG/DL
LYMPHOCYTES # BLD AUTO: 1.8 10^3/UL (ref 0.5–4.7)
LYMPHOCYTES NFR BLD AUTO: 14.8 % (ref 13–45)
MCH RBC QN AUTO: 30.3 PG (ref 27–33.4)
MCHC RBC AUTO-ENTMCNC: 33.7 G/DL (ref 32–36)
MCV RBC AUTO: 90 FL (ref 80–97)
MONOCYTES # BLD AUTO: 1.1 10^3/UL (ref 0.1–1.4)
MONOCYTES NFR BLD AUTO: 9 % (ref 3–13)
NEUTROPHILS # BLD AUTO: 9.1 10^3/UL (ref 1.7–8.2)
NEUTS SEG NFR BLD AUTO: 74.1 % (ref 42–78)
NITRITE UR QL STRIP: NEGATIVE
PH UR STRIP: 5 [PH] (ref 5–9)
PLATELET # BLD: 216 10^3/UL (ref 150–450)
POTASSIUM SERPL-SCNC: 5.1 MMOL/L (ref 3.6–5)
POTASSIUM SERPL-SCNC: 5.4 MMOL/L (ref 3.6–5)
PROT SERPL-MCNC: 8.7 G/DL (ref 6.3–8.2)
PROT SERPL-MCNC: 8.8 G/DL (ref 6.3–8.2)
PROT UR STRIP-MCNC: 100 MG/DL
RBC # BLD AUTO: 3.48 10^6/UL (ref 3.72–5.28)
SODIUM SERPL-SCNC: 137.8 MMOL/L (ref 137–145)
SODIUM SERPL-SCNC: 138 MMOL/L (ref 137–145)
SP GR UR STRIP: 1.01
TOTAL CELLS COUNTED % (AUTO): 100 %
TROPONIN I SERPL-MCNC: < 0.012 NG/ML
UROBILINOGEN UR-MCNC: NEGATIVE MG/DL (ref ?–2)
WBC # BLD AUTO: 12.2 10^3/UL (ref 4–10.5)

## 2018-06-10 PROCEDURE — 82550 ASSAY OF CK (CPK): CPT

## 2018-06-10 PROCEDURE — 94799 UNLISTED PULMONARY SVC/PX: CPT

## 2018-06-10 PROCEDURE — 85027 COMPLETE CBC AUTOMATED: CPT

## 2018-06-10 PROCEDURE — 71045 X-RAY EXAM CHEST 1 VIEW: CPT

## 2018-06-10 PROCEDURE — 87186 SC STD MICRODIL/AGAR DIL: CPT

## 2018-06-10 PROCEDURE — 36415 COLL VENOUS BLD VENIPUNCTURE: CPT

## 2018-06-10 PROCEDURE — 87088 URINE BACTERIA CULTURE: CPT

## 2018-06-10 PROCEDURE — 80048 BASIC METABOLIC PNL TOTAL CA: CPT

## 2018-06-10 PROCEDURE — 84484 ASSAY OF TROPONIN QUANT: CPT

## 2018-06-10 PROCEDURE — 84100 ASSAY OF PHOSPHORUS: CPT

## 2018-06-10 PROCEDURE — 85025 COMPLETE CBC W/AUTO DIFF WBC: CPT

## 2018-06-10 PROCEDURE — 83735 ASSAY OF MAGNESIUM: CPT

## 2018-06-10 PROCEDURE — 93010 ELECTROCARDIOGRAM REPORT: CPT

## 2018-06-10 PROCEDURE — 93005 ELECTROCARDIOGRAM TRACING: CPT

## 2018-06-10 PROCEDURE — 94640 AIRWAY INHALATION TREATMENT: CPT

## 2018-06-10 PROCEDURE — 87086 URINE CULTURE/COLONY COUNT: CPT

## 2018-06-10 PROCEDURE — 94667 MNPJ CHEST WALL 1ST: CPT

## 2018-06-10 PROCEDURE — 81001 URINALYSIS AUTO W/SCOPE: CPT

## 2018-06-10 PROCEDURE — 80053 COMPREHEN METABOLIC PANEL: CPT

## 2018-06-10 PROCEDURE — 99285 EMERGENCY DEPT VISIT HI MDM: CPT

## 2018-06-10 PROCEDURE — 87040 BLOOD CULTURE FOR BACTERIA: CPT

## 2018-06-10 PROCEDURE — 82553 CREATINE MB FRACTION: CPT

## 2018-06-10 RX ADMIN — Medication SCH ML: at 22:53

## 2018-06-10 RX ADMIN — IPRATROPIUM BROMIDE AND ALBUTEROL SULFATE SCH ML: 2.5; .5 SOLUTION RESPIRATORY (INHALATION) at 20:39

## 2018-06-10 RX ADMIN — SODIUM CHLORIDE SCH ML: 9 INJECTION, SOLUTION INTRAVENOUS at 22:47

## 2018-06-10 RX ADMIN — HEPARIN SODIUM SCH UNIT: 5000 INJECTION, SOLUTION INTRAVENOUS; SUBCUTANEOUS at 22:49

## 2018-06-10 RX ADMIN — SENNOSIDES, DOCUSATE SODIUM SCH EACH: 50; 8.6 TABLET, FILM COATED ORAL at 22:53

## 2018-06-10 RX ADMIN — QUETIAPINE FUMARATE SCH MG: 25 TABLET, FILM COATED ORAL at 22:48

## 2018-06-10 NOTE — RADIOLOGY REPORT (SQ)
EXAM DESCRIPTION:  CHEST SINGLE VIEW



COMPLETED DATE/TIME:  6/10/2018 7:34 pm



REASON FOR STUDY:  hypoxia, fever



COMPARISON:  Chest x-ray 12/26/2017.



EXAM PARAMETERS:  NUMBER OF VIEWS: One view.

TECHNIQUE: Single frontal radiographic view of the chest acquired.

RADIATION DOSE: NA

LIMITATIONS: None.



FINDINGS:  LUNGS AND PLEURA: No consolidation, pneumothorax or pleural effusion.

MEDIASTINUM AND HILAR STRUCTURES: No masses.  Contour normal.

HEART AND VASCULAR STRUCTURES: Heart normal in size.  Normal vasculature.

BONES: No acute findings.

HARDWARE: None in the chest.



IMPRESSION:  No acute radiographic finding in the chest.



TECHNICAL DOCUMENTATION:  JOB ID:  [id number]

OH-[id number]

 2011 [employer]- All Rights Reserved



Reading location - IP/workstation name: [email or web address]

## 2018-06-10 NOTE — ER DOCUMENT REPORT
ED General





- General


Chief Complaint: Syncope


Stated Complaint: Syncope


Time Seen by Provider: 06/10/18 18:36


Cannot obtain history due to: Dementia


Notes: 





The patient is a 77-year-old female who presents from a nursing facility with 

concerns of syncope.  The patient has a history of dementia, is uncertain what 

happened today or why she is here in the emergency department.  She is unable 

to provide meaningful history.  She was noted to be febrile by EMS and brought 

to the hospital as well as hypoxic to 86% on room air of which she does not 

have a baseline history.


TRAVEL OUTSIDE OF THE [place] IN LAST 30 DAYS: No





- Related Data


Allergies/Adverse Reactions: 


 





No Known Allergies Allergy (Verified 02/25/18 09:41)


 











Past Medical History





- General


Information source: Transfer Record, Emergency Med Personnel, Count includes the [facility] Records





- Social History


Smoking Status: Never Smoker


Chew tobacco use (# tins/day): No


Frequency of alcohol use: None


Drug Abuse: None


Lives with: Nursing Home


Family History: Reviewed & Not Pertinent, Other - Patient suffers from dementia


Patient has suicidal ideation: No


Patient has homicidal ideation: No





- Past Medical History


Cardiac Medical History: Reports: Hx Coronary Artery Disease, Hx 

Hypercholesterolemia, Hx Hypertension


Renal/ Medical History: Denies: Hx Peritoneal Dialysis


GI Medical History: Reports: Hx Gastroesophageal Reflux Disease


Musculoskeltal Medical History: Reports Hx Arthritis


Psychiatric Medical History: Reports: Hx Dementia - Alzheimer's


Past Surgical History: Reports: Hx Abdominal Surgery, Hx Vascular Surgery - AAA 

biforcated stent placement





- Immunizations


Hx Diphtheria, Pertussis, Tetanus Vaccination: No





Review of Systems





- Review of Systems


Notes: 





Constitutional: Positive for fever.


HENT: Negative for sore throat.


Eyes: Negative for visual changes.


Cardiovascular: Negative for chest pain.


Respiratory: Positive for shortness of breath.


Gastrointestinal: Negative for abdominal pain, vomiting or diarrhea.


Genitourinary: Negative for dysuria.


Musculoskeletal: Negative for back pain.


Skin: Negative for rash.


Neurological: Negative for headaches, weakness or numbness.





10 point ROS negative except as marked above and in HPI.





Physical Exam





- Vital signs


Vitals: 


 











Resp


 


 19 


 


 06/10/18 17:55











Interpretation: Hypoxic, Tachypneic, Febrile


Notes: 





PHYSICAL EXAMINATION:





GENERAL: Moderately cachectic, no acute distress





HEAD: Atraumatic, normocephalic.





EYES: Pupils equal round and reactive to light, extraocular movements intact, 

sclera anicteric, conjunctiva are normal.





ENT: nares patent, oropharynx clear without exudates.  Dry mucous membranes.





NECK: Normal range of motion, supple without lymphadenopathy





LUNGS: Crackles and diminished breath sounds at the left base.  No distress.  

Lung sounds otherwise clear.





HEART: Regular rate and rhythm without murmurs





ABDOMEN: Soft, nontender, normoactive bowel sounds.  No guarding, no rebound.  

No masses appreciated.





EXTREMITIES: Normal range of motion, no pitting or edema.  No cyanosis.





NEUROLOGICAL: No focal neurological deficits. Moves all extremities 

spontaneously and on command.





PSYCH: Alert, oriented only to person





SKIN: Warm, Dry, normal turgor, no rashes or lesions noted.





Course





- Re-evaluation


Re-evalutation: 





06/10/18 19:42


The patient presents after having a syncopal episode, found to be hypoxic to 86

% on room.  The patient is demented, unable to provide additional meaningful 

history.  On examination she does have diminished breath sounds and crackles at 

the left base but lungs are otherwise clear.  Currently saturating 93% on 2 L 

by nasal cannula.  Laboratories do show acute renal failure, GFR 21 to BUN/

creatinine ratio greater than 20 consistent with prerenal azotemia in the 

setting of dehydration.  The patient also does appear quite clinically 

dehydrated on examination.  Patient's urinalysis is also consistent with acute 

pyelonephritis.  She will require coverage for hospital-acquired pneumonia as 

she does reside in a nursing facility.  She has been started on IV Zosyn dosing 

for renal dysfunction.  IV fluids are also being administered.  I have 

discussed this case with Dr. Yaakov Hatfield who has accepted the patient for 

hospitalization.





- Vital Signs


Vital signs: 


 











Temp Pulse Resp BP Pulse Ox


 


 98.9 F   80   16   134/65 H  97 


 


 06/10/18 23:45  06/11/18 02:00  06/11/18 01:55  06/10/18 23:45  06/10/18 23:45














- Laboratory


Result Diagrams: 


 06/10/18 17:20





 06/10/18 18:50


Laboratory results interpreted by me: 


 











  06/10/18 06/10/18 06/10/18





  17:20 17:20 18:50


 


WBC  12.2 H  


 


RBC  3.48 L  


 


Hgb  10.6 L  


 


Hct  31.3 L  


 


RDW  16.0 H  


 


Absolute Neutrophils  9.1 H  


 


Potassium   5.4 H  5.1 H


 


Carbon Dioxide   19 L 


 


BUN   43 H  42 H


 


Creatinine   2.24 H  2.27 H


 


Est GFR ( Amer)   26 L  25 L


 


Est GFR (Non-Af Amer)   21 L  21 L


 


Glucose   145 H  133 H


 


Direct Bilirubin   0.7 H  0.5 H


 


AST   46 H  37 H


 


Alkaline Phosphatase   161 H  159 H


 


Total Protein   8.7 H  8.8 H


 


Urine Protein   


 


Urine Blood   


 


Ur Leukocyte Esterase   














  06/10/18





  19:10


 


WBC 


 


RBC 


 


Hgb 


 


Hct 


 


RDW 


 


Absolute Neutrophils 


 


Potassium 


 


Carbon Dioxide 


 


BUN 


 


Creatinine 


 


Est GFR ( Amer) 


 


Est GFR (Non-Af Amer) 


 


Glucose 


 


Direct Bilirubin 


 


AST 


 


Alkaline Phosphatase 


 


Total Protein 


 


Urine Protein  100 H


 


Urine Blood  SMALL H


 


Ur Leukocyte Esterase  LARGE H














- Diagnostic Test


Radiology reviewed: Image reviewed, Reports reviewed


Radiology results interpreted by me: 





06/10/18 19:41


Chest x-ray: Left lower lobe infiltrate





- EKG Interpretation by Me


Additional EKG results interpreted by me: 





06/10/18 19:42


Sinus tachycardia.  Rate 93.  No ST elevations or depressions.  QTC is 463.





Discharge





- Discharge


Clinical Impression: 


 Dehydration, Prerenal azotemia, Pyelonephritis, Hypoxia





Left lower lobe pneumonia


Qualifiers:


 Pneumonia type: due to unspecified organism Qualified Code(s): J18.1 - Lobar 

pneumonia, unspecified organism





Dementia


Qualifiers:


 Dementia type: unspecified type Dementia behavioral disturbance: without 

behavioral disturbance Qualified Code(s): F03.90 - Unspecified dementia without 

behavioral disturbance





Condition: Fair


Disposition: ADMITTED AS INPATIENT


Admitting Provider: Hospitalist


Unit Admitted: Telemetry

## 2018-06-11 LAB
ADD MANUAL DIFF: NO
ANION GAP SERPL CALC-SCNC: 12 MMOL/L (ref 5–19)
BASOPHILS # BLD AUTO: 0 10^3/UL (ref 0–0.2)
BASOPHILS NFR BLD AUTO: 0.8 % (ref 0–2)
BUN SERPL-MCNC: 32 MG/DL (ref 7–20)
CALCIUM: 8.6 MG/DL (ref 8.4–10.2)
CHLORIDE SERPL-SCNC: 109 MMOL/L (ref 98–107)
CO2 SERPL-SCNC: 21 MMOL/L (ref 22–30)
EOSINOPHIL # BLD AUTO: 0 10^3/UL (ref 0–0.6)
EOSINOPHIL NFR BLD AUTO: 0.4 % (ref 0–6)
ERYTHROCYTE [DISTWIDTH] IN BLOOD BY AUTOMATED COUNT: 16.3 % (ref 11.5–14)
GLUCOSE SERPL-MCNC: 123 MG/DL (ref 75–110)
HCT VFR BLD CALC: 28.6 % (ref 36–47)
HGB BLD-MCNC: 9.7 G/DL (ref 12–15.5)
LYMPHOCYTES # BLD AUTO: 1 10^3/UL (ref 0.5–4.7)
LYMPHOCYTES NFR BLD AUTO: 20.1 % (ref 13–45)
MCH RBC QN AUTO: 30.1 PG (ref 27–33.4)
MCHC RBC AUTO-ENTMCNC: 33.9 G/DL (ref 32–36)
MCV RBC AUTO: 89 FL (ref 80–97)
MONOCYTES # BLD AUTO: 0.5 10^3/UL (ref 0.1–1.4)
MONOCYTES NFR BLD AUTO: 10.7 % (ref 3–13)
NEUTROPHILS # BLD AUTO: 3.4 10^3/UL (ref 1.7–8.2)
NEUTS SEG NFR BLD AUTO: 68 % (ref 42–78)
PLATELET # BLD: 201 10^3/UL (ref 150–450)
POTASSIUM SERPL-SCNC: 4.7 MMOL/L (ref 3.6–5)
RBC # BLD AUTO: 3.22 10^6/UL (ref 3.72–5.28)
SODIUM SERPL-SCNC: 141.6 MMOL/L (ref 137–145)
TOTAL CELLS COUNTED % (AUTO): 100 %
WBC # BLD AUTO: 5 10^3/UL (ref 4–10.5)

## 2018-06-11 PROCEDURE — 5A09357 ASSISTANCE WITH RESPIRATORY VENTILATION, LESS THAN 24 CONSECUTIVE HOURS, CONTINUOUS POSITIVE AIRWAY PRESSURE: ICD-10-PCS | Performed by: INTERNAL MEDICINE

## 2018-06-11 PROCEDURE — 3E0F73Z INTRODUCTION OF ANTI-INFLAMMATORY INTO RESPIRATORY TRACT, VIA NATURAL OR ARTIFICIAL OPENING: ICD-10-PCS | Performed by: INTERNAL MEDICINE

## 2018-06-11 RX ADMIN — ASPIRIN SCH MG: 81 TABLET, COATED ORAL at 09:43

## 2018-06-11 RX ADMIN — IPRATROPIUM BROMIDE AND ALBUTEROL SULFATE SCH ML: 2.5; .5 SOLUTION RESPIRATORY (INHALATION) at 08:46

## 2018-06-11 RX ADMIN — HEPARIN SODIUM SCH UNIT: 5000 INJECTION, SOLUTION INTRAVENOUS; SUBCUTANEOUS at 06:30

## 2018-06-11 RX ADMIN — IPRATROPIUM BROMIDE AND ALBUTEROL SULFATE SCH ML: 2.5; .5 SOLUTION RESPIRATORY (INHALATION) at 14:25

## 2018-06-11 RX ADMIN — DOCUSATE SODIUM SCH MG: 100 CAPSULE, LIQUID FILLED ORAL at 09:42

## 2018-06-11 RX ADMIN — FLUTICASONE PROPIONATE SCH SPRAY: 50 SPRAY, METERED NASAL at 09:43

## 2018-06-11 RX ADMIN — QUETIAPINE FUMARATE SCH MG: 25 TABLET, FILM COATED ORAL at 21:17

## 2018-06-11 RX ADMIN — SENNOSIDES, DOCUSATE SODIUM SCH EACH: 50; 8.6 TABLET, FILM COATED ORAL at 21:17

## 2018-06-11 RX ADMIN — POLYETHYLENE GLYCOL 3350 SCH GM: 17 POWDER, FOR SOLUTION ORAL at 09:40

## 2018-06-11 RX ADMIN — Medication SCH ML: at 06:32

## 2018-06-11 RX ADMIN — IPRATROPIUM BROMIDE AND ALBUTEROL SULFATE SCH ML: 2.5; .5 SOLUTION RESPIRATORY (INHALATION) at 01:54

## 2018-06-11 RX ADMIN — Medication SCH ML: at 21:17

## 2018-06-11 RX ADMIN — HEPARIN SODIUM SCH UNIT: 5000 INJECTION, SOLUTION INTRAVENOUS; SUBCUTANEOUS at 14:07

## 2018-06-11 RX ADMIN — HEPARIN SODIUM SCH UNIT: 5000 INJECTION, SOLUTION INTRAVENOUS; SUBCUTANEOUS at 21:17

## 2018-06-11 RX ADMIN — SODIUM CHLORIDE SCH ML: 9 INJECTION, SOLUTION INTRAVENOUS at 03:35

## 2018-06-11 RX ADMIN — ATORVASTATIN CALCIUM SCH MG: 10 TABLET, FILM COATED ORAL at 21:16

## 2018-06-11 RX ADMIN — Medication SCH ML: at 14:08

## 2018-06-11 RX ADMIN — IPRATROPIUM BROMIDE AND ALBUTEROL SULFATE SCH ML: 2.5; .5 SOLUTION RESPIRATORY (INHALATION) at 21:07

## 2018-06-11 RX ADMIN — METOPROLOL SUCCINATE SCH MG: 25 TABLET, EXTENDED RELEASE ORAL at 09:42

## 2018-06-11 NOTE — EKG REPORT
SEVERITY:- ABNORMAL ECG -

SINUS RHYTHM

LEFT ANTERIOR FASCICULAR BLOCK

NONSPECIFIC T ABNORMALITIES, ANTERIOR LEADS

:

Confirmed by: Jan Rodriguez MD 11-Jun-2018 07:44:00

## 2018-06-12 RX ADMIN — IPRATROPIUM BROMIDE AND ALBUTEROL SULFATE SCH ML: 2.5; .5 SOLUTION RESPIRATORY (INHALATION) at 13:37

## 2018-06-12 RX ADMIN — SENNOSIDES, DOCUSATE SODIUM SCH EACH: 50; 8.6 TABLET, FILM COATED ORAL at 21:42

## 2018-06-12 RX ADMIN — HEPARIN SODIUM SCH UNIT: 5000 INJECTION, SOLUTION INTRAVENOUS; SUBCUTANEOUS at 21:42

## 2018-06-12 RX ADMIN — IPRATROPIUM BROMIDE AND ALBUTEROL SULFATE SCH ML: 2.5; .5 SOLUTION RESPIRATORY (INHALATION) at 20:25

## 2018-06-12 RX ADMIN — CEFEPIME HYDROCHLORIDE SCH ML: 2 INJECTION, SOLUTION INTRAVENOUS at 21:38

## 2018-06-12 RX ADMIN — FLUTICASONE PROPIONATE SCH SPRAY: 50 SPRAY, METERED NASAL at 09:37

## 2018-06-12 RX ADMIN — SODIUM CHLORIDE PRN ML: 9 INJECTION, SOLUTION INTRAVENOUS at 21:38

## 2018-06-12 RX ADMIN — METOPROLOL SUCCINATE SCH MG: 25 TABLET, EXTENDED RELEASE ORAL at 09:35

## 2018-06-12 RX ADMIN — Medication SCH ML: at 13:42

## 2018-06-12 RX ADMIN — ATORVASTATIN CALCIUM SCH MG: 10 TABLET, FILM COATED ORAL at 21:42

## 2018-06-12 RX ADMIN — Medication SCH ML: at 05:35

## 2018-06-12 RX ADMIN — IPRATROPIUM BROMIDE AND ALBUTEROL SULFATE SCH ML: 2.5; .5 SOLUTION RESPIRATORY (INHALATION) at 03:31

## 2018-06-12 RX ADMIN — POLYETHYLENE GLYCOL 3350 SCH GM: 17 POWDER, FOR SOLUTION ORAL at 09:34

## 2018-06-12 RX ADMIN — DOCUSATE SODIUM SCH MG: 100 CAPSULE, LIQUID FILLED ORAL at 09:35

## 2018-06-12 RX ADMIN — HEPARIN SODIUM SCH UNIT: 5000 INJECTION, SOLUTION INTRAVENOUS; SUBCUTANEOUS at 13:41

## 2018-06-12 RX ADMIN — IPRATROPIUM BROMIDE AND ALBUTEROL SULFATE SCH ML: 2.5; .5 SOLUTION RESPIRATORY (INHALATION) at 08:47

## 2018-06-12 RX ADMIN — QUETIAPINE FUMARATE SCH MG: 25 TABLET, FILM COATED ORAL at 21:42

## 2018-06-12 RX ADMIN — HEPARIN SODIUM SCH UNIT: 5000 INJECTION, SOLUTION INTRAVENOUS; SUBCUTANEOUS at 05:35

## 2018-06-12 RX ADMIN — ASPIRIN SCH MG: 81 TABLET, COATED ORAL at 09:37

## 2018-06-12 RX ADMIN — Medication SCH ML: at 21:38

## 2018-06-12 NOTE — PDOC H&P
History of Present Illness


Admission Date/PCP: 


  06/10/18 19:52





  





Patient complains of: Syncope


History of Present Illness: 


YOKO ACEVEDO is a 77 year old female nursing home resident, with a history of 

dementia, 6 cm abdominal aortic aneurysm, chronic bilateral hydronephrosis, 

stage II chronic kidney disease, cholelithiasis and recurrent, chronic 

constipation.  Unable to provide history herself it is obtained by the record.  

She is found by nursing home staff to have fever,  hypoxia brought to the 

emergency room for evaluation where she is found to have an exam suggestive of 

left sided pneumonia, Leukocytosis and fever.  Urinalysis is also suggestive of 

pyuria.  She receives empiric antibiotics and referred to the hospitalist for 

admission.








Past Medical History


Cardiac Medical History: Reports: Coronary Artery Disease, Hyperlipidema, 

Hypertension


GI Medical History: Reports: Gastroesophageal Reflux Disease


Musculoskeltal Medical History: Reports: Arthritis


Psychiatric Medical History: Reports: Dementia - Alzheimer's





Past Surgical History


Past Surgical History: Reports: Vascular Surgery - AAA biforcated stent 

placement





Social History


Information Source: Emergency Med Personnel, [facility] Records


Lives with: Nursing Home


Smoking Status: Never Smoker


Frequency of Alcohol Use: None


Hx Recreational Drug Use: No


Drugs: None


Hx Prescription Drug Abuse: No





- Advance Directive


Resuscitation Status: Full Code





Family History


Family History: Other - Patient suffers from dementia


Parental Family History Reviewed: Yes


Children Family History Reviewed: Yes


Sibling(s) Family History Reviewed.: Yes





Medication/Allergy


Home Medications: 








Acetaminophen [Mapap] 500 mg PO TID 06/11/18 


Aspirin [Adult Low Dose Aspirin EC] 81 mg PO QAM 06/11/18 


Atorvastatin Calcium [Lipitor 10 mg Tablet] 10 mg PO QHS 06/11/18 


Cold Cream/Zinc Oxide/Star/Quique [Dermacloud Ointment] 1 applic TOP PRN PRN 06/11/ 18 


Cyanocobalamin (Vitamin B-12) [Vitamin B-12] 1,000 mcg PO QAM 06/11/18 


Docusate Sodium [Colace 100 mg Capsule] 100 mg PO BID 06/11/18 


Fluticasone Propionate [Flonase Nasal Spray 50 Mcg/Spray 16 gm] 2 spray NASL 

DAILY 06/11/18 


Guaifenesin [Robafen] 10 ml PO Q4HP PRN 06/11/18 


Haloperidol [Haldol 0.5 mg Tablet] 0.5 mg PO Q6HP PRN 06/11/18 


Mag Hydrox/Al Hydrox/Simeth [Maalox Plus Susp 30 Udcup] 30 ml PO Q6HP PRN 06/11/ 18 


Magnesium Hydroxide [Milk of Magnesia 30 ml Udcup] 30 ml PO PRN PRN 06/11/18 


Metoprolol Succinate [Toprol Xl 25 mg Tab.sr] 12.5 mg PO DAILY 06/11/18 


Mirtazapine 7.5 mg PO QHS 06/11/18 


Nitroglycerin [Nitrostat 0.4 mg (1/150 Gr) Tabs 25/Bottle] 0.4 mg SL Q5MP PRN 06 /11/18 


Omeprazole 40 mg PO QAM 06/11/18 


Polyethylene Glycol 3350 [Miralax Powder 17 gm/Packet] 17 gm PO QAM 06/11/18 


Quetiapine Fumarate [Seroquel] 50 mg PO QHS 06/11/18 


Sennosides [Senna] 17.2 mg PO QHS 06/11/18 


Thiamine Mononitrate (Vit B1) [Vitamin B-1] 100 mg PO QAM 06/11/18 








Allergies/Adverse Reactions: 


 





No Known Allergies Allergy (Verified 02/25/18 09:41)


 











Review of Systems


ROS unobtainable: Due to mental status - Unobtainable





Physical Exam


Vital Signs: 


 











Temp Pulse Resp BP Pulse Ox


 


 98.6 F   84   16   126/55 H  94 


 


 06/12/18 03:03  06/12/18 03:03  06/12/18 03:03  06/12/18 03:03  06/12/18 03:03








 Intake & Output











 06/10/18 06/11/18 06/12/18





 11:59 11:59 11:59


 


Intake Total  1613 4034


 


Balance  1613 4034


 


Weight  47 kg 45.6 kg











General appearance: PRESENT: cooperative, disheveled, mild distress, thin


Head exam: PRESENT: atraumatic, normocephalic


Eye exam: PRESENT: conjunctiva pink, EOMI, PERRLA.  ABSENT: scleral icterus


Ear exam: PRESENT: normal external ear exam


Mouth exam: PRESENT: moist, tongue midline


Neck exam: ABSENT: carotid bruit, JVD, lymphadenopathy, thyromegaly


Respiratory exam: PRESENT: accessory muscle use, crackles, prolonged expiratory 

phas, retraction, rhonchi, tachypnea


Cardiovascular exam: PRESENT: RRR.  ABSENT: diastolic murmur, rubs, systolic 

murmur


Pulses: PRESENT: normal dorsalis pedis pul


Vascular exam: PRESENT: normal capillary refill


GI/Abdominal exam: PRESENT: normal bowel sounds, soft.  ABSENT: distended, 

guarding, mass, organolmegaly, rebound, tenderness


Rectal exam: PRESENT: deferred


Extremities exam: PRESENT: full ROM.  ABSENT: calf tenderness, clubbing, pedal 

edema


Neurological exam: PRESENT: alert, awake, oriented to person, CN II-XII grossly 

intact.  ABSENT: motor sensory deficit


Psychiatric exam: PRESENT: agitated, anxious, normal mood.  ABSENT: homicidal 

ideation, suicidal ideation


Skin exam: PRESENT: dry, intact, warm.  ABSENT: cyanosis, rash





Results


Laboratory Results: 


 





 06/11/18 09:00 





 06/11/18 09:00 





 











  06/11/18 06/11/18





  09:00 09:00


 


WBC  5.0 


 


RBC  3.22 L 


 


Hgb  9.7 L 


 


Hct  28.6 L 


 


MCV  89 


 


MCH  30.1 


 


MCHC  33.9 


 


RDW  16.3 H 


 


Plt Count  201 


 


Seg Neutrophils %  68.0 


 


Lymphocytes %  20.1 


 


Monocytes %  10.7 


 


Eosinophils %  0.4 


 


Basophils %  0.8 


 


Absolute Neutrophils  3.4 


 


Absolute Lymphocytes  1.0 


 


Absolute Monocytes  0.5 


 


Absolute Eosinophils  0.0 


 


Absolute Basophils  0.0 


 


Sodium   141.6


 


Potassium   4.7


 


Chloride   109 H


 


Carbon Dioxide   21 L


 


Anion Gap   12


 


BUN   32 H


 


Creatinine   1.91 H


 


Est GFR ( Amer)   31 L


 


Est GFR (Non-Af Amer)   26 L


 


Glucose   123 H


 


Calcium   8.6











Impressions: 


 





Chest X-Ray  06/10/18 19:19


IMPRESSION:  No acute radiographic finding in the chest.


 














Assessment & Plan





- Diagnosis


(1) Left lower lobe pneumonia


Qualifiers: 


   Pneumonia type: due to unspecified organism   Qualified Code(s): J18.1 - 

Lobar pneumonia, unspecified organism   


Is this a current diagnosis for this admission?: Yes   


Plan: 


Telemetry admission, pneumonia care set, empiric antibiotics, albuterol and 

Atrovent with incentive spirometry.








(2) Dementia


Qualifiers: 


   Dementia type: unspecified type   Dementia behavioral disturbance: without 

behavioral disturbance   Qualified Code(s): F03.90 - Unspecified dementia 

without behavioral disturbance   


Is this a current diagnosis for this admission?: Yes   


Plan: 


Supportive care








(3) Hypoxia


Is this a current diagnosis for this admission?: Yes   


Plan: 


Supplemental oxygen and BiPAP as tolerated as needed








(4) Prerenal azotemia


Is this a current diagnosis for this admission?: Yes   


Plan: 


At baseline, avoid nephrotoxic meds and doses IV fluid challenge, follow-up 

chemistry








(5) UTI (urinary tract infection)


Qualifiers: 


   Encounter type: initial encounter 


Is this a current diagnosis for this admission?: Yes   


Plan: 


Empiric antibiotics initiated, follow-up CBC, urine culture.








- Time


Time Spent: 30 to 50 Minutes





- Inpatient Certification


Medical Necessity: Need Close Monitoring Due to Risk of Patient Decompensation

## 2018-06-12 NOTE — PDOC PROGRESS REPORT
Subjective


Progress Note for:: 06/12/18


Subjective:: 





YOKO ACEVEDO is a 77 y.o. F with a PMH of dementia, AAA, chronic bilateral 

hydronephrosis, CKD stage II, cholelithiasis, chronic constipation.  She 

presented to the ED on 6/10/2018 for fever and hypoxia.  CXR revealed LLL 

pneumonia.  Urinalysis also suggestive of UTI, urine culture positive for E. 

coli. 





Patient was seen this morning on rounds, she is resting comfortably in bed on 

room air.  The patient states she feels 'lousy' but is unable to verbalize 

specific symptoms.  She denies chest pain, shortness of breath, fever, chills, 

or dysuria. The patient is alert and oriented to self ONLY, disoriented to time

, place, and situation. 


Reason For Visit: 


ARF/UTI/PNEUMONIA








Physical Exam


Vital Signs: 


 











Temp Pulse Resp BP Pulse Ox


 


 98.9 F   93   18   139/65 H  96 


 


 06/12/18 15:34  06/12/18 15:34  06/12/18 15:34  06/12/18 15:34  06/12/18 15:34








 Intake & Output











 06/11/18 06/12/18 06/13/18





 06:59 06:59 06:59


 


Intake Total 1613 4034 354


 


Balance 1613 4034 354


 


Weight 47 kg 45.6 kg 











General appearance: PRESENT: no acute distress, thin


Eye exam: PRESENT: conjunctiva pink, PERRLA


Mouth exam: PRESENT: moist


Neck exam: PRESENT: full ROM


Respiratory exam: PRESENT: clear to auscultation [place], symmetrical, unlabored


Cardiovascular exam: PRESENT: +S1, +S2


Pulses: PRESENT: normal radial pulses, normal dorsalis pedis pul


GI/Abdominal exam: PRESENT: normal bowel sounds, soft.  ABSENT: tenderness


Rectal exam: PRESENT: deferred


Extremities exam: PRESENT: full ROM


Musculoskeletal exam: PRESENT: ambulatory, full ROM


Neurological exam: PRESENT: alert, awake, oriented to person.  ABSENT: oriented 

to place, oriented to time, oriented to situation


Psychiatric exam: PRESENT: other - CONFUSION, DEMENTIA


Skin exam: PRESENT: dry, intact, warm





Results


Laboratory Results: 


 





 06/11/18 09:00 





 06/11/18 09:00 








Impressions: 


 





Chest X-Ray  06/10/18 19:19


IMPRESSION:  No acute radiographic finding in the chest.


 











Status: Imported from PACS





Assessment & Plan





- Diagnosis


(1) Left lower lobe pneumonia


Qualifiers: 


   Pneumonia type: due to unspecified organism   Qualified Code(s): J18.1 - 

Lobar pneumonia, unspecified organism   


Is this a current diagnosis for this admission?: Yes   


Plan: 


Patient presented from nursing home with fever and hypoxia


CXR revealed LLL pneumonia.


Treated with 1 dose of Zosyn in the emergency department.


No antibiotic therapy within the last 24 hours, unclear reasoning.


Initiate cefepime 2 g IV q8hr for healthcare associated pneumonia.


If febrile > 101, will re-culture








(2) UTI (urinary tract infection)


Qualifiers: 


   Encounter type: initial encounter 


Is this a current diagnosis for this admission?: Yes   


Plan: 


Urinalysis upon admission indicative of UTI.


Urine culture positive for E. coli, sensitive to cefepime.


Initiate cefepime 2 g IV q8h - double coverage for PNA and UTI


If febrile > 101, will need to re-culture








(3) Prerenal azotemia


Is this a current diagnosis for this admission?: Yes   


Plan: 


Improving.  Likely secondary to dehydration stemming from acute infection.


Creatinine improved from 2.4 to 1.9.  Baseline creatinine 1.0


Continue maintenance IVF.











(4) Dementia


Qualifiers: 


   Dementia type: unspecified type   Dementia behavioral disturbance: without 

behavioral disturbance   Qualified Code(s): F03.90 - Unspecified dementia 

without behavioral disturbance   


Is this a current diagnosis for this admission?: Yes   





- Time


Time Spent with patient: 15-24 minutes


Medications reviewed and adjusted accordingly: Yes


Anticipated discharge: Acute Rehab





- Inpatient Certification


Based on my medical assessment, after consideration of the patient's 

comorbidities, presenting symptoms, or acuity I expect that the services needed 

warrant INPATIENT care.: Yes


I certify that my determination is in accordance with my understanding of 

Medicare's requirements for reasonable and necessary INPATIENT services [42 CFR 

412.3e].: Yes


Medical Necessity: Risk of Complication if Not Cared For in Hospital





- Plan Summary


Plan Summary: 





Following antibiotic treatment, patient can be discharged back to skilled 

nursing facility.

## 2018-06-13 LAB
ANION GAP SERPL CALC-SCNC: 13 MMOL/L (ref 5–19)
BUN SERPL-MCNC: 24 MG/DL (ref 7–20)
CALCIUM: 9.1 MG/DL (ref 8.4–10.2)
CHLORIDE SERPL-SCNC: 110 MMOL/L (ref 98–107)
CO2 SERPL-SCNC: 21 MMOL/L (ref 22–30)
GLUCOSE SERPL-MCNC: 148 MG/DL (ref 75–110)
PHOSPHATE SERPL-MCNC: 3.1 MG/DL (ref 2.5–4.5)
POTASSIUM SERPL-SCNC: 4.6 MMOL/L (ref 3.6–5)
SODIUM SERPL-SCNC: 143.6 MMOL/L (ref 137–145)

## 2018-06-13 RX ADMIN — ASPIRIN SCH MG: 81 TABLET, COATED ORAL at 09:11

## 2018-06-13 RX ADMIN — CEFEPIME HYDROCHLORIDE SCH ML: 2 INJECTION, SOLUTION INTRAVENOUS at 09:12

## 2018-06-13 RX ADMIN — FLUTICASONE PROPIONATE SCH SPRAY: 50 SPRAY, METERED NASAL at 09:13

## 2018-06-13 RX ADMIN — CEFEPIME HYDROCHLORIDE SCH ML: 2 INJECTION, SOLUTION INTRAVENOUS at 21:23

## 2018-06-13 RX ADMIN — IPRATROPIUM BROMIDE AND ALBUTEROL SULFATE SCH ML: 2.5; .5 SOLUTION RESPIRATORY (INHALATION) at 14:08

## 2018-06-13 RX ADMIN — SENNOSIDES, DOCUSATE SODIUM SCH EACH: 50; 8.6 TABLET, FILM COATED ORAL at 21:23

## 2018-06-13 RX ADMIN — DOCUSATE SODIUM SCH MG: 100 CAPSULE, LIQUID FILLED ORAL at 09:11

## 2018-06-13 RX ADMIN — HEPARIN SODIUM SCH UNIT: 5000 INJECTION, SOLUTION INTRAVENOUS; SUBCUTANEOUS at 14:18

## 2018-06-13 RX ADMIN — Medication SCH ML: at 06:02

## 2018-06-13 RX ADMIN — IPRATROPIUM BROMIDE AND ALBUTEROL SULFATE SCH ML: 2.5; .5 SOLUTION RESPIRATORY (INHALATION) at 01:05

## 2018-06-13 RX ADMIN — Medication SCH ML: at 14:18

## 2018-06-13 RX ADMIN — HEPARIN SODIUM SCH UNIT: 5000 INJECTION, SOLUTION INTRAVENOUS; SUBCUTANEOUS at 06:04

## 2018-06-13 RX ADMIN — QUETIAPINE FUMARATE SCH MG: 25 TABLET, FILM COATED ORAL at 21:23

## 2018-06-13 RX ADMIN — METOPROLOL SUCCINATE SCH MG: 25 TABLET, EXTENDED RELEASE ORAL at 09:10

## 2018-06-13 RX ADMIN — IPRATROPIUM BROMIDE AND ALBUTEROL SULFATE SCH ML: 2.5; .5 SOLUTION RESPIRATORY (INHALATION) at 20:45

## 2018-06-13 RX ADMIN — HEPARIN SODIUM SCH UNIT: 5000 INJECTION, SOLUTION INTRAVENOUS; SUBCUTANEOUS at 21:23

## 2018-06-13 RX ADMIN — Medication SCH ML: at 21:13

## 2018-06-13 RX ADMIN — POLYETHYLENE GLYCOL 3350 SCH GM: 17 POWDER, FOR SOLUTION ORAL at 09:11

## 2018-06-13 RX ADMIN — ATORVASTATIN CALCIUM SCH MG: 10 TABLET, FILM COATED ORAL at 21:22

## 2018-06-13 RX ADMIN — IPRATROPIUM BROMIDE AND ALBUTEROL SULFATE SCH ML: 2.5; .5 SOLUTION RESPIRATORY (INHALATION) at 08:49

## 2018-06-13 NOTE — PDOC PROGRESS REPORT
Subjective


Progress Note for:: 06/13/18


Subjective:: 





YOKO ACEVEDO is a 77 y.o. F with a PMH of dementia, AAA, chronic bilateral 

hydronephrosis, CKD stage II, cholelithiasis, chronic constipation.  She 

presented to the ED on 6/10/2018 for fever and hypoxia.  CXR revealed LLL 

pneumonia.  Urinalysis also suggestive of UTI, urine culture positive for E. 

coli. 





Patient was seen this morning on rounds, she is resting comfortably in bed on 

room air.  The patient would not open her eyes when stimulated - she would pull 

the covers over herself when removed, her face was scowling (despite her eyes 

being closed), and the patient would not answer questions when asked. Nursing 

staff reported there was no change in her mental status, she was still only 

oriented to self.  


Reason For Visit: 


ARF/UTI/PNEUMONIA








Physical Exam


Vital Signs: 


 











Temp Pulse Resp BP Pulse Ox


 


 97.9 F   96   16   115/55 L  96 


 


 06/13/18 16:06  06/13/18 19:00  06/13/18 16:06  06/13/18 16:06  06/13/18 16:06








 Intake & Output











 06/12/18 06/13/18 06/14/18





 06:59 06:59 06:59


 


Intake Total 4034 3171 1729


 


Output Total  706 200


 


Balance 4034 2465 1529


 


Weight 45.6 kg 59 kg 











General appearance: PRESENT: no acute distress


Eye exam: PRESENT: conjunctiva pink, PERRLA


Mouth exam: PRESENT: moist


Teeth exam: PRESENT: poor dentation


Neck exam: PRESENT: full ROM


Respiratory exam: PRESENT: clear to auscultation [place], symmetrical, unlabored


Cardiovascular exam: PRESENT: +S1, +S2


Pulses: PRESENT: normal radial pulses, normal dorsalis pedis pul


Vascular exam: PRESENT: normal capillary refill


GI/Abdominal exam: PRESENT: normal bowel sounds, soft.  ABSENT: tenderness


Rectal exam: PRESENT: deferred


Extremities exam: PRESENT: full ROM


Musculoskeletal exam: PRESENT: ambulatory, full ROM


Neurological exam: PRESENT: oriented to person.  ABSENT: oriented to place, 

oriented to time, oriented to situation


Skin exam: PRESENT: dry, intact, warm





Results


Laboratory Results: 


 





 06/11/18 09:00 





 06/13/18 11:35 





 











  06/13/18 06/13/18





  10:14 11:35


 


Sodium  Cancelled  143.6


 


Potassium  Cancelled  4.6


 


Chloride  Cancelled  110 H


 


Carbon Dioxide  Cancelled  21 L


 


Anion Gap  Cancelled  13


 


BUN  Cancelled  24 H


 


Creatinine  Cancelled  1.41 H


 


Est GFR ( Amer)  Cancelled  44 L


 


Est GFR (Non-Af Amer)  Cancelled  36 L


 


Glucose  Cancelled  148 H


 


Calcium  Cancelled  9.1


 


Phosphorus  Cancelled  3.1


 


Magnesium  Cancelled  2.2











Impressions: 


 





Chest X-Ray  06/10/18 19:19


IMPRESSION:  No acute radiographic finding in the chest.


 











Status: Imported from PACS





Assessment & Plan





- Diagnosis


(1) Left lower lobe pneumonia


Qualifiers: 


   Pneumonia type: due to unspecified organism   Qualified Code(s): J18.1 - 

Lobar pneumonia, unspecified organism   


Is this a current diagnosis for this admission?: Yes   


Plan: 


Patient presented from nursing home with fever and hypoxia


CXR revealed LLL pneumonia.


Treated with 1 dose of Zosyn in the emergency department.


Continue cefepime 2 g IV q8hr for healthcare associated pneumonia.


If febrile > 101, will re-culture








(2) UTI (urinary tract infection)


Qualifiers: 


   Encounter type: initial encounter 


Is this a current diagnosis for this admission?: Yes   


Plan: 


Urinalysis upon admission indicative of UTI.


Urine culture positive for E. coli, sensitive to cefepime.


Continue cefepime 2 g IV q8h - double coverage for PNA and UTI


If febrile > 101, will need to re-culture








(3) Acute on chronic renal failure


Is this a current diagnosis for this admission?: Yes   


Plan: 


Improving.  Likely secondary to dehydration stemming from acute infection.


Patient has a history of CKD II, baseline creatinine 1.0


Creatinine improved from 2.4 to 1.9.  


Continue maintenance IVF.








(4) Dementia


Qualifiers: 


   Dementia type: unspecified type   Dementia behavioral disturbance: without 

behavioral disturbance   Qualified Code(s): F03.90 - Unspecified dementia 

without behavioral disturbance   


Is this a current diagnosis for this admission?: Yes   





- Time


Time Spent with patient: 15-24 minutes


Medications reviewed and adjusted accordingly: Yes


Anticipated discharge: SNF


Within: within 48 hours





- Inpatient Certification


Based on my medical assessment, after consideration of the patient's 

comorbidities, presenting symptoms, or acuity I expect that the services needed 

warrant INPATIENT care.: Yes


I certify that my determination is in accordance with my understanding of 

Medicare's requirements for reasonable and necessary INPATIENT services [42 CFR 

412.3e].: Yes


Medical Necessity: Need For IV Fluids, Need for IV Antibiotics





- Plan Summary


Plan Summary: 





discharge back to [facility] skilled nursing facility

## 2018-06-14 LAB
ADD MANUAL DIFF: NO
ALBUMIN SERPL-MCNC: 2.9 G/DL (ref 3.5–5)
ALP SERPL-CCNC: 150 U/L (ref 38–126)
ALT SERPL-CCNC: 36 U/L (ref 9–52)
ANION GAP SERPL CALC-SCNC: 12 MMOL/L (ref 5–19)
AST SERPL-CCNC: 42 U/L (ref 14–36)
BASOPHILS # BLD AUTO: 0.1 10^3/UL (ref 0–0.2)
BASOPHILS NFR BLD AUTO: 1.5 % (ref 0–2)
BILIRUB DIRECT SERPL-MCNC: 0.2 MG/DL (ref 0–0.4)
BILIRUB SERPL-MCNC: 0.2 MG/DL (ref 0.2–1.3)
BUN SERPL-MCNC: 24 MG/DL (ref 7–20)
CALCIUM: 8.9 MG/DL (ref 8.4–10.2)
CHLORIDE SERPL-SCNC: 114 MMOL/L (ref 98–107)
CO2 SERPL-SCNC: 21 MMOL/L (ref 22–30)
EOSINOPHIL # BLD AUTO: 0.3 10^3/UL (ref 0–0.6)
EOSINOPHIL NFR BLD AUTO: 5.6 % (ref 0–6)
ERYTHROCYTE [DISTWIDTH] IN BLOOD BY AUTOMATED COUNT: 15.4 % (ref 11.5–14)
GLUCOSE SERPL-MCNC: 116 MG/DL (ref 75–110)
HCT VFR BLD CALC: 27.2 % (ref 36–47)
HGB BLD-MCNC: 9 G/DL (ref 12–15.5)
LYMPHOCYTES # BLD AUTO: 1.4 10^3/UL (ref 0.5–4.7)
LYMPHOCYTES NFR BLD AUTO: 27.9 % (ref 13–45)
MCH RBC QN AUTO: 29.5 PG (ref 27–33.4)
MCHC RBC AUTO-ENTMCNC: 32.9 G/DL (ref 32–36)
MCV RBC AUTO: 90 FL (ref 80–97)
MONOCYTES # BLD AUTO: 0.4 10^3/UL (ref 0.1–1.4)
MONOCYTES NFR BLD AUTO: 8.4 % (ref 3–13)
NEUTROPHILS # BLD AUTO: 2.9 10^3/UL (ref 1.7–8.2)
NEUTS SEG NFR BLD AUTO: 56.6 % (ref 42–78)
PLATELET # BLD: 215 10^3/UL (ref 150–450)
POTASSIUM SERPL-SCNC: 4.4 MMOL/L (ref 3.6–5)
PROT SERPL-MCNC: 6.2 G/DL (ref 6.3–8.2)
RBC # BLD AUTO: 3.04 10^6/UL (ref 3.72–5.28)
SODIUM SERPL-SCNC: 146.6 MMOL/L (ref 137–145)
TOTAL CELLS COUNTED % (AUTO): 100 %
WBC # BLD AUTO: 5.2 10^3/UL (ref 4–10.5)

## 2018-06-14 RX ADMIN — SODIUM CHLORIDE PRN ML: 9 INJECTION, SOLUTION INTRAVENOUS at 05:11

## 2018-06-14 RX ADMIN — METOPROLOL SUCCINATE SCH MG: 25 TABLET, EXTENDED RELEASE ORAL at 09:30

## 2018-06-14 RX ADMIN — Medication SCH ML: at 05:07

## 2018-06-14 RX ADMIN — IPRATROPIUM BROMIDE AND ALBUTEROL SULFATE SCH ML: 2.5; .5 SOLUTION RESPIRATORY (INHALATION) at 08:18

## 2018-06-14 RX ADMIN — ASPIRIN SCH MG: 81 TABLET, COATED ORAL at 09:30

## 2018-06-14 RX ADMIN — HEPARIN SODIUM SCH UNIT: 5000 INJECTION, SOLUTION INTRAVENOUS; SUBCUTANEOUS at 21:26

## 2018-06-14 RX ADMIN — IPRATROPIUM BROMIDE AND ALBUTEROL SULFATE SCH ML: 2.5; .5 SOLUTION RESPIRATORY (INHALATION) at 14:04

## 2018-06-14 RX ADMIN — FLUTICASONE PROPIONATE SCH SPRAY: 50 SPRAY, METERED NASAL at 09:31

## 2018-06-14 RX ADMIN — QUETIAPINE FUMARATE SCH MG: 25 TABLET, FILM COATED ORAL at 21:26

## 2018-06-14 RX ADMIN — DOCUSATE SODIUM SCH MG: 100 CAPSULE, LIQUID FILLED ORAL at 09:32

## 2018-06-14 RX ADMIN — IPRATROPIUM BROMIDE AND ALBUTEROL SULFATE SCH ML: 2.5; .5 SOLUTION RESPIRATORY (INHALATION) at 02:18

## 2018-06-14 RX ADMIN — CEFEPIME HYDROCHLORIDE SCH ML: 2 INJECTION, SOLUTION INTRAVENOUS at 21:26

## 2018-06-14 RX ADMIN — ATORVASTATIN CALCIUM SCH MG: 10 TABLET, FILM COATED ORAL at 21:26

## 2018-06-14 RX ADMIN — CEFEPIME HYDROCHLORIDE SCH ML: 2 INJECTION, SOLUTION INTRAVENOUS at 09:31

## 2018-06-14 RX ADMIN — Medication SCH ML: at 21:05

## 2018-06-14 RX ADMIN — POLYETHYLENE GLYCOL 3350 SCH GM: 17 POWDER, FOR SOLUTION ORAL at 09:29

## 2018-06-14 RX ADMIN — HEPARIN SODIUM SCH UNIT: 5000 INJECTION, SOLUTION INTRAVENOUS; SUBCUTANEOUS at 14:12

## 2018-06-14 RX ADMIN — SENNOSIDES, DOCUSATE SODIUM SCH EACH: 50; 8.6 TABLET, FILM COATED ORAL at 21:27

## 2018-06-14 RX ADMIN — Medication SCH ML: at 14:12

## 2018-06-14 RX ADMIN — HEPARIN SODIUM SCH UNIT: 5000 INJECTION, SOLUTION INTRAVENOUS; SUBCUTANEOUS at 05:11

## 2018-06-14 RX ADMIN — IPRATROPIUM BROMIDE AND ALBUTEROL SULFATE SCH ML: 2.5; .5 SOLUTION RESPIRATORY (INHALATION) at 20:56

## 2018-06-14 NOTE — PDOC PROGRESS REPORT
Subjective


Progress Note for:: 06/14/18


Subjective:: 





YOKO ACEVEDO is a 77 y.o. F with a PMH of dementia, AAA, chronic bilateral 

hydronephrosis, CKD stage II, cholelithiasis, chronic constipation.  She 

presented to the ED on 6/10/2018 for fever and hypoxia.  CXR revealed LLL 

pneumonia.  Urinalysis also suggestive of UTI, urine culture positive for E. 

coli. 





Patient was seen this morning on rounds, she is resting comfortably in bed on 

room air.  The patient is alert and oriented to self only.  She is disoriented 

to time, place, situation.  The patient has no complaints, she denies chest pain

, shortness of breath, fever or chills.  Nursing staff reports episodes of 

agitation.  Reportedly, the patient has been yelling and cursing at staff. She 

has not always been cooperative with care. 


Reason For Visit: 


ARF/UTI/PNEUMONIA








Physical Exam


Vital Signs: 


 











Temp Pulse Resp BP Pulse Ox


 


 97.7 F   100   16   153/76 H  98 


 


 06/14/18 15:44  06/14/18 15:44  06/14/18 15:44  06/14/18 15:44  06/14/18 15:44








 Intake & Output











 06/13/18 06/14/18 06/15/18





 06:59 06:59 06:59


 


Intake Total 3171 2766 811


 


Output Total 706 300 300


 


Balance 2465 2466 511


 


Weight 59 kg 43 kg 











General appearance: PRESENT: no acute distress, well-developed, well-nourished


Head exam: PRESENT: atraumatic, normocephalic


Eye exam: PRESENT: conjunctiva pink, EOMI, PERRLA.  ABSENT: scleral icterus


Ear exam: PRESENT: normal external ear exam


Mouth exam: PRESENT: moist, tongue midline


Neck exam: ABSENT: carotid bruit, JVD, lymphadenopathy, thyromegaly


Respiratory exam: PRESENT: clear to auscultation [place].  ABSENT: rales, rhonchi, 

wheezes


Cardiovascular exam: PRESENT: RRR.  ABSENT: diastolic murmur, rubs, systolic 

murmur


Pulses: PRESENT: normal dorsalis pedis pul


Vascular exam: PRESENT: normal capillary refill


GI/Abdominal exam: PRESENT: normal bowel sounds, soft.  ABSENT: distended, 

guarding, mass, organolmegaly, rebound, tenderness


Rectal exam: PRESENT: deferred


Extremities exam: PRESENT: full ROM.  ABSENT: calf tenderness, clubbing, pedal 

edema


Neurological exam: PRESENT: alert, awake, oriented to person.  ABSENT: oriented 

to place, oriented to time, oriented to situation


Skin exam: PRESENT: dry, intact, warm.  ABSENT: cyanosis, rash





Results


Laboratory Results: 


 





 06/14/18 06:56 





 06/14/18 06:56 





 











  06/14/18 06/14/18





  06:56 06:56


 


WBC  5.2 


 


RBC  3.04 L 


 


Hgb  9.0 L 


 


Hct  27.2 L 


 


MCV  90 


 


MCH  29.5 


 


MCHC  32.9 


 


RDW  15.4 H 


 


Plt Count  215 


 


Seg Neutrophils %  56.6 


 


Lymphocytes %  27.9 


 


Monocytes %  8.4 


 


Eosinophils %  5.6 


 


Basophils %  1.5 


 


Absolute Neutrophils  2.9 


 


Absolute Lymphocytes  1.4 


 


Absolute Monocytes  0.4 


 


Absolute Eosinophils  0.3 


 


Absolute Basophils  0.1 


 


Sodium   146.6 H


 


Potassium   4.4


 


Chloride   114 H


 


Carbon Dioxide   21 L


 


Anion Gap   12


 


BUN   24 H


 


Creatinine   1.36 H


 


Est GFR ( Amer)   46 L


 


Est GFR (Non-Af Amer)   38 L


 


Glucose   116 H


 


Calcium   8.9


 


Total Bilirubin   0.2


 


AST   42 H


 


ALT   36


 


Alkaline Phosphatase   150 H


 


Total Protein   6.2 L


 


Albumin   2.9 L











Impressions: 


 





Chest X-Ray  06/10/18 19:19


IMPRESSION:  No acute radiographic finding in the chest.


 











Status: Imported from PACS





Assessment & Plan





- Diagnosis


(1) Left lower lobe pneumonia


Qualifiers: 


   Pneumonia type: due to unspecified organism   Qualified Code(s): J18.1 - 

Lobar pneumonia, unspecified organism   


Is this a current diagnosis for this admission?: Yes   


Plan: 


Patient presented from nursing home with fever and hypoxia


CXR revealed LLL pneumonia.


Treated with 1 dose of Zosyn in the emergency department.


Continue cefepime 2 g IV q8hr for healthcare associated pneumonia.


If febrile > 101, will re-culture








(2) UTI (urinary tract infection)


Qualifiers: 


   Encounter type: initial encounter 


Is this a current diagnosis for this admission?: Yes   


Plan: 


Urinalysis upon admission indicative of UTI.


Urine culture positive for E. coli, sensitive to cefepime.


Continue cefepime 2 g IV q8h - double coverage for PNA and UTI


If febrile > 101, will need to re-culture








(3) Acute on chronic renal failure


Is this a current diagnosis for this admission?: Yes   


Plan: 


Improving.  Likely secondary to dehydration stemming from acute infection.


Patient has a history of CKD II, baseline creatinine 1.0


Creatinine improved from 2.4 to 1.9.  


Continue maintenance IVF.








(4) Dementia


Qualifiers: 


   Dementia type: unspecified type   Dementia behavioral disturbance: without 

behavioral disturbance   Qualified Code(s): F03.90 - Unspecified dementia 

without behavioral disturbance   


Is this a current diagnosis for this admission?: Yes   


Plan: 


History of dementia with acute episodes of delirium while inpatient at [facility].


Acute episodes of delirium likely multifactorial: Manifestation of acute 

disease process, hospital induced delirium


Nursing staff reports patient has been intermittently yelling and cursing.


One-time dose of IV Haldol administered.


Encourage day/night sleeping cycle.


If patient will cooperate, she can be escorted around the unit for daily walks.








- Time


Time Spent with patient: 15-24 minutes


Medications reviewed and adjusted accordingly: Yes


Anticipated discharge: SNF


Within: within 24 hours





- Inpatient Certification


Based on my medical assessment, after consideration of the patient's 

comorbidities, presenting symptoms, or acuity I expect that the services needed 

warrant INPATIENT care.: Yes


I certify that my determination is in accordance with my understanding of 

Medicare's requirements for reasonable and necessary INPATIENT services [42 CFR 

412.3e].: Yes


Medical Necessity: Risk of Complication if Not Cared For in Hospital





- Plan Summary


Plan Summary: 





Likely discharge back to [facility] tomorrow

## 2018-06-15 VITALS — DIASTOLIC BLOOD PRESSURE: 78 MMHG | SYSTOLIC BLOOD PRESSURE: 128 MMHG

## 2018-06-15 LAB
ANION GAP SERPL CALC-SCNC: 10 MMOL/L (ref 5–19)
BUN SERPL-MCNC: 24 MG/DL (ref 7–20)
CALCIUM: 9.3 MG/DL (ref 8.4–10.2)
CHLORIDE SERPL-SCNC: 114 MMOL/L (ref 98–107)
CO2 SERPL-SCNC: 23 MMOL/L (ref 22–30)
ERYTHROCYTE [DISTWIDTH] IN BLOOD BY AUTOMATED COUNT: 15.9 % (ref 11.5–14)
GLUCOSE SERPL-MCNC: 119 MG/DL (ref 75–110)
HCT VFR BLD CALC: 28.6 % (ref 36–47)
HGB BLD-MCNC: 9.5 G/DL (ref 12–15.5)
MCH RBC QN AUTO: 29.7 PG (ref 27–33.4)
MCHC RBC AUTO-ENTMCNC: 33.2 G/DL (ref 32–36)
MCV RBC AUTO: 90 FL (ref 80–97)
PLATELET # BLD: 261 10^3/UL (ref 150–450)
POTASSIUM SERPL-SCNC: 4.5 MMOL/L (ref 3.6–5)
RBC # BLD AUTO: 3.19 10^6/UL (ref 3.72–5.28)
SODIUM SERPL-SCNC: 147 MMOL/L (ref 137–145)
WBC # BLD AUTO: 6.9 10^3/UL (ref 4–10.5)

## 2018-06-15 RX ADMIN — IPRATROPIUM BROMIDE AND ALBUTEROL SULFATE SCH ML: 2.5; .5 SOLUTION RESPIRATORY (INHALATION) at 02:14

## 2018-06-15 RX ADMIN — IPRATROPIUM BROMIDE AND ALBUTEROL SULFATE SCH ML: 2.5; .5 SOLUTION RESPIRATORY (INHALATION) at 08:47

## 2018-06-15 RX ADMIN — HEPARIN SODIUM SCH UNIT: 5000 INJECTION, SOLUTION INTRAVENOUS; SUBCUTANEOUS at 05:51

## 2018-06-15 RX ADMIN — CEFEPIME HYDROCHLORIDE SCH ML: 2 INJECTION, SOLUTION INTRAVENOUS at 10:34

## 2018-06-15 RX ADMIN — METOPROLOL SUCCINATE SCH MG: 25 TABLET, EXTENDED RELEASE ORAL at 10:33

## 2018-06-15 RX ADMIN — DOCUSATE SODIUM SCH MG: 100 CAPSULE, LIQUID FILLED ORAL at 10:33

## 2018-06-15 RX ADMIN — ASPIRIN SCH MG: 81 TABLET, COATED ORAL at 10:34

## 2018-06-15 RX ADMIN — SODIUM CHLORIDE PRN ML: 9 INJECTION, SOLUTION INTRAVENOUS at 05:51

## 2018-06-15 RX ADMIN — Medication SCH ML: at 05:47

## 2018-06-15 RX ADMIN — FLUTICASONE PROPIONATE SCH SPRAY: 50 SPRAY, METERED NASAL at 10:34

## 2018-06-15 RX ADMIN — POLYETHYLENE GLYCOL 3350 SCH GM: 17 POWDER, FOR SOLUTION ORAL at 10:33

## 2018-06-15 NOTE — PDOC TRANSFER SUMMARY
General


Admission Date/PCP: 


  06/10/18 19:52





  





Admission Date: 06/10/18


Transfer Date: 06/15/18


Resuscitation Status: Full Code





- Transfer Diagnosis


(1) Left lower lobe pneumonia


Is this a current diagnosis for this admission?: Yes   





(2) UTI (urinary tract infection)


Is this a current diagnosis for this admission?: Yes   





(3) Acute on chronic renal failure


Is this a current diagnosis for this admission?: Yes   





(4) Dementia


Is this a current diagnosis for this admission?: Yes   





- Transfer Medications


Home Medications: 


 





Acetaminophen [Mapap] 500 mg PO TID 06/11/18 


Aspirin [Adult Low Dose Aspirin EC] 81 mg PO QAM 06/11/18 


Atorvastatin Calcium [Lipitor 10 mg Tablet] 10 mg PO QHS 06/11/18 


Cold Cream/Zinc Oxide/Star/Quique [Dermacloud Ointment] 1 applic TOP PRN PRN 06/11/ 18 


Cyanocobalamin (Vitamin B-12) [Vitamin B-12] 1,000 mcg PO QAM 06/11/18 


Docusate Sodium [Colace 100 mg Capsule] 100 mg PO BID 06/11/18 


Fluticasone Propionate [Flonase Nasal Spray 50 Mcg/Spray 16 gm] 2 spray NASL 

DAILY 06/11/18 


Guaifenesin [Robafen] 10 ml PO Q4HP PRN 06/11/18 


Haloperidol [Haldol 0.5 mg Tablet] 0.5 mg PO Q6HP PRN 06/11/18 


Mag Hydrox/Al Hydrox/Simeth [Maalox Plus Susp 30 Udcup] 30 ml PO Q6HP PRN 06/11/ 18 


Magnesium Hydroxide [Milk of Magnesia 30 ml Udcup] 30 ml PO PRN PRN 06/11/18 


Metoprolol Succinate [Toprol Xl 25 mg Tab.sr] 12.5 mg PO DAILY 06/11/18 


Mirtazapine 7.5 mg PO QHS 06/11/18 


Nitroglycerin [Nitrostat 0.4 mg (1/150 Gr) Tabs 25/Bottle] 0.4 mg SL Q5MP PRN 06 /11/18 


Omeprazole 40 mg PO QAM 06/11/18 


Polyethylene Glycol 3350 [Miralax Powder 17 gm/Packet] 17 gm PO QAM 06/11/18 


Quetiapine Fumarate [Seroquel] 50 mg PO QHS 06/11/18 


Sennosides [Senna] 17.2 mg PO QHS 06/11/18 


Thiamine Mononitrate (Vit B1) [Vitamin B-1] 100 mg PO QAM 06/11/18 








Transfer Medications: 


 Current Medications





Acetaminophen (Tylenol 325 Mg Tablet)  650 mg PO Q4HP PRN


   PRN Reason: PAIN OR FEVER UP 


   Stop: 07/10/18 19:43


Albuterol/Ipratropium (Duoneb 3 Ml Ampul)  3 ml NEB [id number] PRN


   PRN Reason: SHORTNESS OF BREATH


   Stop: 07/10/18 19:40


Albuterol/Ipratropium (Duoneb 3 Ml Ampul)  3 ml NEB RTQ6 JANAK


   Stop: 07/10/18 19:59


   Last Admin: 06/15/18 08:47 Dose:  Not Given


Aspirin (Ecotrin 81 Mg Ec Tablet)  81 mg PO DAILY JANAK


   Stop: 07/11/18 09:59


   Last Admin: 06/14/18 09:30 Dose:  81 mg


Atorvastatin Calcium (Lipitor 10 Mg Tablet)  10 mg PO QHS JANAK


   Stop: 07/11/18 21:59


   Last Admin: 06/14/18 21:26 Dose:  10 mg


Docusate Sodium (Colace 100 Mg Capsule)  100 mg PO DAILY JANAK


   Stop: 07/11/18 09:59


   Last Admin: 06/14/18 09:32 Dose:  100 mg


Fluticasone Propionate (Flonase Nasal Spray 50 Mcg/Spray 16 Gm)  2 spray NASL 

DAILY JANAK


   Stop: 07/11/18 09:59


   Last Admin: 06/14/18 09:31 Dose:  2 spray


Guaifenesin (Robitussin Syrup 200 Mg/10 Ml Ud Cup)  200 mg PO QIDP PRN


   PRN Reason: COUGH


   Stop: 07/10/18 19:40


Haloperidol (Haldol 0.5 Mg Tablet)  0.5 mg PO Q6HP PRN


   PRN Reason: ANXIETY


   Stop: 07/10/18 19:43


Heparin Sodium (Porcine) (Heparin Inj 5,000 Units/Ml 1 Ml Syringe)  5,000 unit 

SUBCUT Q8 JANAK


   Stop: 07/10/18 21:59


   Last Admin: 06/15/18 05:51 Dose:  5,000 unit


Cefepime HCl (Maxipime Rtu 2 Gm-D5w 50 Ml Premix Bag)  2 gm in 50 mls @ 100 mls/

hr IV Q12 JANAK


   Stop: 06/19/18 21:59


   Last Admin: 06/14/18 21:26 Dose:  50 ml


Sodium Chloride (Nacl 0.9% 1000 Ml Iv Soln)  1,000 mls @ 75 mls/hr IV 

CONTINUOUS PRN


   PRN Reason: THIS MED IS NOT "PRN"


   Stop: 07/12/18 12:38


   Last Admin: 06/15/18 05:51 Dose:  1,000 ml


Magnesium Hydroxide (Milk Of Magnesia 30 Ml Udcup)  30 ml PO DAILYP PRN


   PRN Reason: CONSTIPATION


   Stop: 07/10/18 19:43


Metoprolol Succinate (Toprol Xl 25 Mg Tab.Sr)  12.5 mg PO DAILY JANAK


   Stop: 07/11/18 09:59


   Last Admin: 06/14/18 09:30 Dose:  12.5 mg


Nitroglycerin (Nitrostat 0.4 Mg (1/150 Gr) Tabs 25/Bottle)  1 tab SL Q5MP PRN


   PRN Reason: CHEST PAIN


   Stop: 07/10/18 19:43


Polyethylene Glycol (Miralax Powder 17 Gm/Packet)  17 gm PO DAILY JANAK


   Stop: 07/11/18 09:59


   Last Admin: 06/14/18 09:29 Dose:  17 gm


Quetiapine Fumarate (Seroquel 25 Mg Tablet)  50 mg PO QHS JANAK


   Stop: 07/10/18 21:59


   Last Admin: 06/14/18 21:26 Dose:  50 mg


Senna/Docusate Sodium (Senna Plus Tablet)  2 each PO QHS JANAK


   Stop: 07/10/18 21:59


   Last Admin: 06/14/18 21:27 Dose:  Not Given


Sodium Chloride (Saline Flush 2.5 Ml Monoject Prefil Syrin)  2.5 ml IV Q8 JANAK


   Stop: 07/10/18 21:59


   Last Admin: 06/15/18 05:47 Dose:  Not Given











- Allergies


Allergies/Adverse Reactions: 


 





No Known Allergies Allergy (Verified 02/25/18 09:41)


 











Hospital Course


Hospital Course: 





YOKO ACEVEDO is a 77 year old female nursing home resident, with a history of 

dementia, 6 cm abdominal aortic aneurysm, chronic bilateral hydronephrosis, 

stage II chronic kidney disease, cholelithiasis and recurrent, chronic 

constipation.  Unable to provide history herself it is obtained by the record.  

She is found by nursing home staff to have fever,  hypoxia brought to the 

emergency room for evaluation where she is found to have an exam suggestive of 

left sided pneumonia, Leukocytosis and fever.  Urinalysis is also suggestive of 

pyuria.  She receives empiric antibiotics and referred to the hospitalist for 

admission.





Urine culture grew MDR e.coli. Blood cultures were negative. The patient was 

treated with Cefepime IV to cover healthcare associated PNA (HAP) as well as 

her MDR e.coli UTI. The patient was also noted to be in ARF (creatinine 1.9). 

The patient has a history of CKD II but her baseline creatinine is 1.0. Her ARF 

is likely secondary to dehydration stemming from acute infection. The patient 

was treated with continuous IVF and her creatinine improved from 2.4 to 1.9.  





After 5 days inpatient, the decision was made to send Ms. Acevedo back to [facility]. 

She will need to continue oral Levaquin to treat her HAP and MDR UTI. No other 

changes were made to her medication regimen. 





Physical Exam


Vital Signs: 


 











Temp Pulse Resp BP Pulse Ox


 


 97.5 F   109 H  16   128/78 H  90 L


 


 06/15/18 08:22  06/15/18 08:22  06/15/18 08:22  06/15/18 08:22  06/15/18 08:22








 Intake & Output











 06/14/18 06/15/18 06/16/18





 06:59 06:59 06:59


 


Intake Total 2766 2738 


 


Output Total 300 300 


 


Balance 2466 2438 


 


Weight 43 kg 43.2 kg 














Results


Laboratory Results: 


 





 06/15/18 06:11 





 06/15/18 06:11 





 











  06/15/18 06/15/18





  06:11 06:11


 


WBC  6.9 


 


RBC  3.19 L 


 


Hgb  9.5 L 


 


Hct  28.6 L 


 


MCV  90 


 


MCH  29.7 


 


MCHC  33.2 


 


RDW  15.9 H 


 


Plt Count  261 


 


Sodium   147.0 H


 


Potassium   4.5


 


Chloride   114 H


 


Carbon Dioxide   23


 


Anion Gap   10


 


BUN   24 H


 


Creatinine   1.14


 


Est GFR ( Amer)   56 L


 


Est GFR (Non-Af Amer)   46 L


 


Glucose   119 H


 


Calcium   9.3


 


Magnesium   2.2











Impressions: 


 





Chest X-Ray  06/10/18 19:19


IMPRESSION:  No acute radiographic finding in the chest.


 











Status: Imported from PACS





Plan


Discharge Plan: 


DISCHARGE BACK TO [facility]. CONTINUE 4 DAYS OF ANTIBIOTICS FOR UTI AND HAP


Time Spent: Less than 30 Minutes

## 2018-09-09 ENCOUNTER — HOSPITAL ENCOUNTER (EMERGENCY)
Dept: HOSPITAL 62 - ER | Age: 78
Discharge: HOME | End: 2018-09-09
Payer: MEDICARE

## 2018-09-09 VITALS — DIASTOLIC BLOOD PRESSURE: 70 MMHG | SYSTOLIC BLOOD PRESSURE: 133 MMHG

## 2018-09-09 DIAGNOSIS — F03.91: ICD-10-CM

## 2018-09-09 DIAGNOSIS — R10.9: ICD-10-CM

## 2018-09-09 DIAGNOSIS — Z51.5: Primary | ICD-10-CM

## 2018-09-09 PROCEDURE — 99283 EMERGENCY DEPT VISIT LOW MDM: CPT

## 2018-09-09 NOTE — ER DOCUMENT REPORT
ED General





- General


Chief Complaint: Abdominal Pain


Stated Complaint: ABDOMINAL PAIN


Time Seen by Provider: 09/09/18 20:46


Cannot obtain history due to: Dementia


Notes: 





Patient is a 77-year-old female who presents from her nursing facility with 

reported complaint of abdominal pain.  The patient is profoundly demented and 

knows only her knee and is unable to provide any meaningful history.  She 

currently denies any complaints and states that she does not know why she is 

here.


TRAVEL OUTSIDE OF THE [place] IN LAST 30 DAYS: No





- Related Data


Allergies/Adverse Reactions: 


 





No Known Allergies Allergy (Verified 02/25/18 09:41)


 











Past Medical History





- General


Information source: Transfer Record, Outside Facility Records


Cannot obtain history due to: Dementia





- Social History


Smoking Status: Never Smoker


Frequency of alcohol use: None


Drug Abuse: None


Lives with: Nursing Home


Family History: Other - Patient suffers from dementia


Patient has suicidal ideation: No


Patient has homicidal ideation: No





- Past Medical History


Cardiac Medical History: Reports: Hx Coronary Artery Disease, Hx 

Hypercholesterolemia, Hx Hypertension


Renal/ Medical History: Denies: Hx Peritoneal Dialysis


GI Medical History: Reports: Hx Gastroesophageal Reflux Disease


Musculoskeletal Medical History: Reports Hx Arthritis


Psychiatric Medical History: Reports: Hx Dementia - Alzheimer's


Past Surgical History: Reports: Hx Abdominal Surgery, Hx Vascular Surgery - AAA 

biforcated stent placement





- Immunizations


Hx Diphtheria, Pertussis, Tetanus Vaccination: No





Review of Systems





- Review of Systems


Notes: 





Constitutional: Negative for fever.


HENT: Negative for sore throat.


Eyes: Negative for visual changes.


Cardiovascular: Negative for chest pain.


Respiratory: Negative for shortness of breath.


Gastrointestinal: Negative for abdominal pain, vomiting or diarrhea.


Genitourinary: Negative for dysuria.


Musculoskeletal: Negative for back pain.


Skin: Negative for rash.


Neurological: Negative for headaches, weakness or numbness.





10 point ROS negative except as marked above and in HPI.





Physical Exam





- Vital signs


Vitals: 


 











Temp Pulse BP Pulse Ox


 


 97.7 F   72   133/70 H  95 


 


 09/09/18 20:38  09/09/18 20:38  09/09/18 20:38  09/09/18 20:38











Interpretation: Normal


Notes: 





PHYSICAL EXAMINATION:





GENERAL: Appears stated age.  Resting comfortably in bed.  In no distress.





HEAD: Atraumatic, normocephalic.





EYES: Pupils equal round and reactive to light, extraocular movements intact, 

sclera anicteric, conjunctiva are normal.





ENT: nares patent, oropharynx clear without exudates.  Moderately dry mucous 

membranes.





NECK: Normal range of motion, supple without lymphadenopathy





LUNGS: Breath sounds clear to auscultation bilaterally and equal.  No wheezes 

rales or rhonchi.





HEART: Regular rate and rhythm without murmurs





ABDOMEN: Soft, nontender, normoactive bowel sounds.  No guarding, no rebound.  

No masses appreciated.





EXTREMITIES: Normal range of motion, no pitting or edema.  No cyanosis.





NEUROLOGICAL: No focal neurological deficits. Moves all extremities 

spontaneously and on command.





PSYCH: Alert, oriented only to person





SKIN: Warm, Dry, normal turgor, no rashes or lesions noted.





Course





- Re-evaluation


Re-evalutation: 





09/09/18 21:13


Presentation of a profoundly demented 77-year-old woman in no distress from her 

nursing facility apparently with concerns of abdominal pain.  The patient 

currently has no abdominal pain, denies any complaints.  She is profoundly 

demented however.  She is currently on hospice care and the hospice nurse did 

contact her facility stating that she is uncertain why the patient was 

transferred given her hospice status.  Patient has no focal abdominal tenderness

, rebound or guarding on exam.  Given that she is under hospice care which we 

have called to confirm is the case do not believe it is appropriate to proceed 

with diagnostic evaluation for the reported complaint of abdominal pain and the 

patient currently denies.  She will be transferred back to her facility and I 

have encouraged ongoing comfort measures.





- Vital Signs


Vital signs: 


 











Temp Pulse Resp BP Pulse Ox


 


 97.7 F   72      133/70 H  95 


 


 09/09/18 20:38  09/09/18 20:38     09/09/18 20:38  09/09/18 20:38














Discharge





- Discharge


Clinical Impression: 


 Hospice care patient





Dementia


Qualifiers:


 Dementia type: unspecified type Dementia behavioral disturbance: with 

behavioral disturbance Qualified Code(s): F03.91 - Unspecified dementia with 

behavioral disturbance





Abdominal pain


Qualifiers:


 Abdominal location: unspecified location Qualified Code(s): R10.9 - 

Unspecified abdominal pain





Disposition: HOME, SELF-CARE


Additional Instructions: 


The patient is under hospice measures.  She should be referred to the emergency 

department only for symptomatic control.  No workup was undertaken today as she 

is currently hospice status.

## 2019-09-27 NOTE — ER DOCUMENT REPORT
ED General





- General


Stated Complaint: FALL/ ANKLE PAIN


Time Seen by Provider: 01/13/18 22:55


Notes: 





77-year-old lady with Alzheimer's presents with left ankle pain bruising and 

swelling first noticed today, but likely sustained 2 days ago after she fell 

off a couch.  She has been walking but limping at her facility.  She denies hip 

pain or knee pain on that side.  She is at her neurologic baseline.


TRAVEL OUTSIDE OF THE [place] IN LAST 30 DAYS: No





- Related Data


Allergies/Adverse Reactions: 


 





No Known Allergies Allergy (Verified 11/13/17 17:41)


 











Past Medical History





- General


Cannot obtain history due to: Dementia





- Social History


Smoking Status: Former Smoker


Family History: Reviewed & Not Pertinent





- Past Medical History


Cardiac Medical History: Reports: Hx Coronary Artery Disease, Hx 

Hypercholesterolemia, Hx Hypertension


Renal/ Medical History: Denies: Hx Peritoneal Dialysis


GI Medical History: Reports: Hx Gastroesophageal Reflux Disease


Musculoskeltal Medical History: Reports Hx Arthritis


Psychiatric Medical History: Reports: Hx Dementia - Alzheimer's


Past Surgical History: Reports: Hx Abdominal Surgery





- Immunizations


Hx Diphtheria, Pertussis, Tetanus Vaccination: No





Review of Systems





- Review of Systems


Notes: 





REVIEW OF SYSTEMS





Not obtained secondary to dementia





PHYSICAL EXAMINATION





General: No acute distress, well-nourished


Head: Atraumatic, normocephalic


ENT: Mouth normal, oropharynx moist, no exudates or tonsillar enlargement


Eyes: Conjunctiva normal, pupils equal, lids normal


Neck: No JVD, supple, no guarding


CVS: Normal rate, regular rhythm, no murmurs


Resp: No resp distress, equal and normal breath sounds bilaterally


GI: Nondistended, soft, no tenderness to palpation, no rebound or guarding


Ext: Edema and tenderness on the lateral malleolar area with ecchymosis.  

Minimal medial sided tenderness but no edema.  Knee and hip are normal on the 

left.


Back: No CVA or midline TTP


Skin: No rash, warm


Lymphatic: No lymphadeopathy noted


Neuro: Awake, alert.  Face symmetric.  GCS 15.


-: Yes ROS unobtainable due to patient's medical condition





Course





- Re-evaluation


Re-evalutation: 





01/13/18 23:11


Left ankle pain and swelling in a demented patient.  She is ambulatory however 

it is certainly possible she still has a fracture given that she is demented.  

This is likely sustained an injury 2 days ago.  I see no other signs of injury 

and she is at her baseline neurologically.  X-rays have been ordered.


01/13/18 23:55


X-ray negative.  Ace wrap weightbearing as tolerated.  I have discussed with 

the patient there likely diagnosis, aftercare plan, follow-up plans and my 

usual and customary return precautions.  They verbalized understanding of this.





- Diagnostic Test


Radiology reviewed: Image reviewed, Reports reviewed





Discharge





- Discharge


Clinical Impression: 


Left ankle sprain


Qualifiers:


 Encounter type: initial encounter Involved ligament of ankle: anterior 

talofibular ligament Qualified Code(s): S93.492A - Sprain of other ligament of 

left ankle, initial encounter





Condition: Good


Disposition: HOME, SELF-CARE


Instructions:  Sprained Ankle ([facility]), Ice Packs ([facility]) Pulmonology

## 2025-05-28 NOTE — PDOC PROGRESS REPORT
Message to provider:  Prescription for [facility]    Emailed to Amberly and put in her mailbox    [] Writer advised caller of callback from clinic within 24-72 hours   Subjective


Progress Note for:: 06/11/18


Subjective:: 


She is seen resting in bed.  She presently is awake and alert, she is 

disoriented 3.  There is presently no family at the bedside.  Review of 

systems cannot be obtained due to patient's mentation.  Nursing report no 

issues overnight.


Reason For Visit: 


ARF/UTI/PNEUMONIA








Physical Exam


Vital Signs: 


 











Temp Pulse Resp BP Pulse Ox


 


 99.8 F   93   18   130/65 H  92 


 


 06/11/18 08:32  06/11/18 14:25  06/11/18 14:25  06/11/18 08:32  06/11/18 14:25








 Intake & Output











 06/10/18 06/11/18 06/12/18





 06:59 06:59 06:59


 


Intake Total  1613 


 


Balance  1613 


 


Weight  47 kg 











General appearance: PRESENT: no acute distress, thin, well-developed


Head exam: PRESENT: atraumatic, normocephalic


Eye exam: PRESENT: conjunctiva pink, EOMI, PERRLA.  ABSENT: scleral icterus


Ear exam: PRESENT: normal external ear exam


Mouth exam: PRESENT: moist, tongue midline


Teeth exam: PRESENT: edentulous


Neck exam: ABSENT: carotid bruit, JVD, lymphadenopathy, thyromegaly


Respiratory exam: PRESENT: clear to auscultation [place], symmetrical, unlabored


Cardiovascular exam: PRESENT: RRR.  ABSENT: diastolic murmur, rubs, systolic 

murmur


Pulses: PRESENT: normal carotid pulses, normal radial pulses


Vascular exam: PRESENT: normal capillary refill


GI/Abdominal exam: PRESENT: normal bowel sounds, soft.  ABSENT: distended, 

guarding, mass, organolmegaly, rebound, tenderness


Rectal exam: PRESENT: deferred


Extremities exam: PRESENT: full ROM.  ABSENT: calf tenderness, clubbing, pedal 

edema


Musculoskeletal exam: PRESENT: full ROM, normal inspection


Neurological exam: PRESENT: alert, altered, awake, CN II-XII grossly intact


Psychiatric exam: PRESENT: flat affect


Skin exam: PRESENT: dry, intact, warm.  ABSENT: cyanosis, rash





Results


Laboratory Results: 


 





 06/11/18 09:00 





 06/11/18 09:00 





 











  06/11/18 06/11/18





  09:00 09:00


 


WBC  5.0 


 


RBC  3.22 L 


 


Hgb  9.7 L 


 


Hct  28.6 L 


 


MCV  89 


 


MCH  30.1 


 


MCHC  33.9 


 


RDW  16.3 H 


 


Plt Count  201 


 


Seg Neutrophils %  68.0 


 


Lymphocytes %  20.1 


 


Monocytes %  10.7 


 


Eosinophils %  0.4 


 


Basophils %  0.8 


 


Absolute Neutrophils  3.4 


 


Absolute Lymphocytes  1.0 


 


Absolute Monocytes  0.5 


 


Absolute Eosinophils  0.0 


 


Absolute Basophils  0.0 


 


Sodium   141.6


 


Potassium   4.7


 


Chloride   109 H


 


Carbon Dioxide   21 L


 


Anion Gap   12


 


BUN   32 H


 


Creatinine   1.91 H


 


Est GFR ( Amer)   31 L


 


Est GFR (Non-Af Amer)   26 L


 


Glucose   123 H


 


Calcium   8.6











Impressions: 


 





Chest X-Ray  06/10/18 19:19


IMPRESSION:  No acute radiographic finding in the chest.


 














Assessment & Plan





- Diagnosis


(1) Hypoxia


Is this a current diagnosis for this admission?: Yes   


Plan: 


Possible early left lower lobe pneumonia.  We will continue to hydrate, with 

antibiotics and repeat chest x-ray.  Certainly at risk for aspiration with her 

dementia.








(2) Acute on chronic renal failure


Is this a current diagnosis for this admission?: Yes   


Plan: 


Secondary to poor oral intake, dehydration.  Improving with IV hydration.








(3) Anemia


Qualifiers: 


 


Is this a current diagnosis for this admission?: Yes   





(4) DNR (do not resuscitate)


Is this a current diagnosis for this admission?: Yes   





(5) Moderate protein-calorie malnutrition


Is this a current diagnosis for this admission?: Yes   


Plan: 


Liberalize diet with nutritional supplements.








(6) UTI (urinary tract infection)


Qualifiers: 


   Encounter type: initial encounter 


Is this a current diagnosis for this admission?: Yes   


Plan: 


Urine cultures pending.  Continue IV broad-spectrum antibiotics.











(8) Dementia


Qualifiers: 


   Dementia type: unspecified type   Dementia behavioral disturbance: without 

behavioral disturbance   Qualified Code(s): F03.90 - Unspecified dementia 

without behavioral disturbance   


Is this a current diagnosis for this admission?: Yes   





- Time


Time Spent with patient: 25-34 minutes


Total Critical Time (Minutes): 25


Medications reviewed and adjusted accordingly: Yes


Anticipated discharge: SNF